# Patient Record
Sex: FEMALE | Race: BLACK OR AFRICAN AMERICAN | NOT HISPANIC OR LATINO | Employment: FULL TIME | URBAN - NONMETROPOLITAN AREA
[De-identification: names, ages, dates, MRNs, and addresses within clinical notes are randomized per-mention and may not be internally consistent; named-entity substitution may affect disease eponyms.]

---
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
## 2025-01-22 ENCOUNTER — OFFICE VISIT (OUTPATIENT)
Dept: PSYCHIATRY | Facility: OTHER | Age: 20
End: 2025-01-22
Attending: NURSE PRACTITIONER
Payer: COMMERCIAL

## 2025-01-22 VITALS
SYSTOLIC BLOOD PRESSURE: 122 MMHG | BODY MASS INDEX: 23.9 KG/M2 | WEIGHT: 143.6 LBS | TEMPERATURE: 97.6 F | HEART RATE: 87 BPM | OXYGEN SATURATION: 100 % | RESPIRATION RATE: 16 BRPM | DIASTOLIC BLOOD PRESSURE: 84 MMHG

## 2025-01-22 DIAGNOSIS — F15.21 METHAMPHETAMINE USE DISORDER, MODERATE, IN EARLY REMISSION (H): ICD-10-CM

## 2025-01-22 DIAGNOSIS — F33.1 MAJOR DEPRESSIVE DISORDER, RECURRENT EPISODE, MODERATE (H): ICD-10-CM

## 2025-01-22 DIAGNOSIS — F41.9 ANXIETY: Primary | ICD-10-CM

## 2025-01-22 PROCEDURE — G0463 HOSPITAL OUTPT CLINIC VISIT: HCPCS

## 2025-01-22 RX ORDER — VENLAFAXINE HYDROCHLORIDE 150 MG/1
150 CAPSULE, EXTENDED RELEASE ORAL DAILY
Qty: 30 CAPSULE | Refills: 3 | Status: SHIPPED | OUTPATIENT
Start: 2025-01-22

## 2025-01-22 ASSESSMENT — ANXIETY QUESTIONNAIRES
6. BECOMING EASILY ANNOYED OR IRRITABLE: MORE THAN HALF THE DAYS
GAD7 TOTAL SCORE: 11
1. FEELING NERVOUS, ANXIOUS, OR ON EDGE: MORE THAN HALF THE DAYS
IF YOU CHECKED OFF ANY PROBLEMS ON THIS QUESTIONNAIRE, HOW DIFFICULT HAVE THESE PROBLEMS MADE IT FOR YOU TO DO YOUR WORK, TAKE CARE OF THINGS AT HOME, OR GET ALONG WITH OTHER PEOPLE: SOMEWHAT DIFFICULT
7. FEELING AFRAID AS IF SOMETHING AWFUL MIGHT HAPPEN: SEVERAL DAYS
5. BEING SO RESTLESS THAT IT IS HARD TO SIT STILL: MORE THAN HALF THE DAYS
8. IF YOU CHECKED OFF ANY PROBLEMS, HOW DIFFICULT HAVE THESE MADE IT FOR YOU TO DO YOUR WORK, TAKE CARE OF THINGS AT HOME, OR GET ALONG WITH OTHER PEOPLE?: SOMEWHAT DIFFICULT
3. WORRYING TOO MUCH ABOUT DIFFERENT THINGS: SEVERAL DAYS
2. NOT BEING ABLE TO STOP OR CONTROL WORRYING: SEVERAL DAYS
4. TROUBLE RELAXING: MORE THAN HALF THE DAYS
GAD7 TOTAL SCORE: 11
GAD7 TOTAL SCORE: 11
7. FEELING AFRAID AS IF SOMETHING AWFUL MIGHT HAPPEN: SEVERAL DAYS

## 2025-01-22 ASSESSMENT — PATIENT HEALTH QUESTIONNAIRE - PHQ9
10. IF YOU CHECKED OFF ANY PROBLEMS, HOW DIFFICULT HAVE THESE PROBLEMS MADE IT FOR YOU TO DO YOUR WORK, TAKE CARE OF THINGS AT HOME, OR GET ALONG WITH OTHER PEOPLE: SOMEWHAT DIFFICULT
SUM OF ALL RESPONSES TO PHQ QUESTIONS 1-9: 13
SUM OF ALL RESPONSES TO PHQ QUESTIONS 1-9: 13

## 2025-01-22 ASSESSMENT — PAIN SCALES - GENERAL: PAINLEVEL_OUTOF10: SEVERE PAIN (8)

## 2025-01-22 NOTE — NURSING NOTE
"Chief Complaint   Patient presents with    Recheck Medication     Patient presents to the Clinic today for medication management. Patient states she's noticed she usually starts to feel a \"crash\" around 1600/1700 everyday.     Initial /84 (BP Location: Right arm, Patient Position: Sitting, Cuff Size: Adult Regular)   Pulse 87   Temp 97.6  F (36.4  C) (Temporal)   Resp 16   Wt 65.1 kg (143 lb 9.6 oz)   LMP 12/01/2024 (Exact Date)   SpO2 100%   BMI 23.90 kg/m   Estimated body mass index is 23.9 kg/m  as calculated from the following:    Height as of 12/17/24: 1.651 m (5' 5\").    Weight as of this encounter: 65.1 kg (143 lb 9.6 oz).  Medication Review: complete    The next two questions are to help us understand your food security.  If you are feeling you need any assistance in this area, we have resources available to support you today.          1/22/2025   SDOH- Food Insecurity   Within the past 12 months, did you worry that your food would run out before you got money to buy more? N   Within the past 12 months, did the food you bought just not last and you didn t have money to get more? N         Health Care Directive:  Patient does not have a Health Care Directive: Discussed advance care planning with patient; however, patient declined at this time.    Emi Bautista      "

## 2025-01-22 NOTE — PATIENT INSTRUCTIONS
"Rana, it was a pleasure seeing you today. As we discussed, we will increase Effexor Er to 150mg daily.        Call the clinic with medication questions or concerns at [phone] or send a [employer] message. [employer] may be used to communicate with your provider, but this is not intended to be used for emergencies.     Crisis Resources for [facility]: First Call for Help: (211), H.O.P.E. Crisis Line available 24/7: ([phone])  [place] Crisis Services: National Crisis Text Line: text HOME to 592377    Crisis Resources for [place]: Adult Mental Health crisis: 218-288-2100, [place] Crisis Text Line: text \"[place]\" or \"HOME\" to 037732    Therapy Options in [place], [place] and Surrounding Area:    Lucius Vazquez, [facility] - ([phone])    [facility] - [facility]ector Rosas ([phone])    Nicole Joshua, [facility], family and individual therapy- ([phone])    [facility] - specializes in women and adolescent therapy - ([phone])    [facility] - EMDR, trauma, etc. ([phone])    [facility] - spiritual based support groups ([phone])    [facility] - adults, adolescents and children ([phone])    [facility] - several different therapy options adults and children ([phone])    [facility] - several options, one of the largest mental health providers in the area - ([phone])    [facility] - ([phone])    [facility] - offers several therapy options including 8-week \"express\" therapy program - ([phone])    [facility] - ([phone])    [facility] - adult therapy ([phone])    [facility] - ([phone])    [facility] - ([phone])    [facility] - ([phone])    [facility] - ([phone])    [place], [place] - ([phone])    [facility]" [facility], [place] [phone]    [facility], [place] [phone]    [facility], [place] [phone]

## 2025-01-22 NOTE — PROGRESS NOTES
[facility] AND [facility] PSYCHIATRY   PROGRESS NOTE       ASSESSMENT & PLAN     This is a 19 year old female with a PMH of depression, methamphetamine abuse and anxiety who presents virtually for ongoing psychiatric care and medication management.      Diagnosis Comments   1. Major depressive disorder, recurrent episode, moderate to severe (H)      12/17/2024    11:33 AM 12/19/2024     8:18 AM 1/22/2025     8:41 AM   PHQ   PHQ-9 Total Score 19  18  13    Q9: Thoughts of better off dead/self-harm past 2 weeks Several days  Several days Not at all   F/U: Thoughts of suicide or self-harm No  No    F/U: Safety concerns No  No        Patient-reported    Proxy-reported             2. Methamphetamine use disorder, moderate, in early remission (H)  Early remission - stated she remains sober      3. Anxiety      12/5/2024     8:23 AM 12/19/2024     8:18 AM 1/22/2025     8:42 AM   TEAGAN-7 SCORE   Total Score 17 (severe anxiety) 12 (moderate anxiety) 11 (moderate anxiety)   Total Score 17  12  11        Patient-reported                         Depression: Improving symptoms of anhedonia, depressed mood, sleep disruption with delayed onset and frequent waking, appetite and overeating, feelings of low self-worth, trouble concentrating, restlessness, and suicidal ideation. Denied presence of SI, though again marked this on the screener.     Anxiety: Reports improvement in anxious mood, worrying about things and having trouble controlling the worry, trouble relaxing, restlessness, feeling easily irritable, fearing something awful happening.    Still some mild to moderate symptoms of depression and anxiety but notably improved. Has had some issues with insurance coverage of two different doses, so the last couple days took 3 of the 37.5mg. Took her last 3 today. Agreeable to increasing dose to 150mg so that she can just take one dose. This may also benefit residual symptoms. No s/e noted outside of some increased end of the  day fatigue, but this has benefited her sleep. No other issues related to mental health. Does have an issue with a tooth and is having trouble finding a dentist who will take her secondary to insurance. Recommended she call the [facility] to see if they will see her.      Medication:   Increase Effexor to 150mg daily  Psychotherapy: none  Labs: No labs today  F/U: 3 months    The risks, benefits, alternatives and side effects have been discussed and are understood by the patient. The patient understands the risks of using street drugs or alcohol. The patient understands to call 911, [employer] ([facility] Crisis Line) or come to the nearest ED if life threatening or urgent symptoms present.    The longitudinal plan of care for the diagnosis(es)/condition(s) as documented were addressed during this visit. Due to the added complexity in care, I will continue to support Danny in the subsequent management and with ongoing continuity of care.      HISTORY OF PRESENT ILLNESS     Danny Rosenberg was last seen in clinic on 12/19//24.  At that time:  Depression: Improving symptoms of anhedonia, depressed mood, sleep disruption with delayed onset and frequent waking, appetite and overeating, feelings of low self-worth, trouble concentrating, restlessness, and suicidal ideation. Denied presence of SI, though again marked this on the screener. Stated she feels things are going really well. Noted improved energy, having a lot on her mind but not getting fixated or obsessive about one thing, improved motivation and is not isolating as much, and does not feel as depressed as she did.      Anxiety: Reports improvement in anxious mood, worrying about things and having trouble controlling the worry, trouble relaxing, restlessness, feeling easily irritable, fearing something awful happening. Indicated that previously she would hyperfixate on one issue and obsessively ruminate about it. She still has anxiety but is able to  "consider multiple things going on versus becoming stuck on one thing.     Feels she could tolerate an increase to maximize on benefits. Screener scores are still fairly high;however, she only began to notice the most improvement in the past week when she went from 37.5mg to 75mg of Effexor. Agreeable to increasing to 112.5mg for a month to see how things go. No noted side effects. Was relieved not to experience any \"brain zaps,\" that she has heard about.      Initial onset: 7th grade noted anxiety and depression  Duration: ongoing with notable improvement  Modifying factors: medications helpful, has also improved diet, works more, exercises, hangs out with friends and attends [employer] and groups for sobriety  Adherence to treatment: good  Response to treatment: improvement noted  Contributing and complicating factors: methamphetamine abuse with new sobriety, recent pregnancy loss, daily marijuana use             [facility] Update - no changes   Psychiatric:   Previous trials of sertraline and prozac.  Started therapy at [facility] but can only be seen every month and a half, which is not workign for her. Voluntary 8 day admission to [facility] recently. Childhood trauma history - details not shared. H/O cutting, smashing and punching things, including smashing head repeatedly. Last episode over a month ago.   H/O suicide attempts, at least 8, by overdose and attempting to drive car off of road.     Social:  No changes. Single. No children. No HS grad or GED. Employed at [employer]. No legal issues. No  history. Has been attending [employer] and is hoping to get baptized.     Chemical Use:  Methamphetamine use since January 2023 with two periods of sobriety, both pregnancy related (lost pregnancies). Longest period was 4 months. Now sober about 3 months. Marijuana use daily for anxiety, sleep and stress.     Family:  H/o psychosis, depression, bipolar, and anxiety both sides.          REVIEW " OF SYSTEMS   The review of systems is negative other than noted in the HPI.    Past Medical History:   Diagnosis Date    Closed fracture of lower end of humerus     8/20/11,Left supracondylar humeral fracture; had pinning    Constipation     Miralax    Gastro-esophageal reflux disease without esophagitis     Age 5; had rash with ranitidine; did not try other medication at the time    Glycosuria     Blood test neg for glucose per mother    Personal history of other diseases of the female genital tract     term, no complications    Simple febrile convulsions (H)     4/13/2017      Current Outpatient Medications   Medication Instructions    buPROPion (WELLBUTRIN XL) 300 mg, Oral, EVERY MORNING    hydrocortisone (CORTAID) 1 % external cream Topical, 2 TIMES DAILY    traZODone (DESYREL) 50 mg, Oral, AT BEDTIME    XULANE 150-35 MCG/24HR patch No dose, route, or frequency recorded.         MENTAL STATUS EXAM   Vitals: /84 (BP Location: Right arm, Patient Position: Sitting, Cuff Size: Adult Regular)   Pulse 87   Temp 97.6  F (36.4  C) (Temporal)   Resp 16   Wt 65.1 kg (143 lb 9.6 oz)   LMP 12/01/2024 (Exact Date)   SpO2 100%   BMI 23.90 kg/m        Appearance:  awake, alert and adequately groomed  Attitude:  cooperative  Eye Contact:  good  Mood: Improving  Affect:  appropriate, congruent  Speech:  clear, coherent  Psychomotor Behavior:  no evidence of tardive dyskinesia, dystonia, or tics  Thought Process:  logical, linear, and goal oriented  Associations:  no loose associations  Thought Content:  No SI present, No HI, no evidence of psychosis or perceptual disturbances  Insight:  good  Judgment:  intact  Oriented to:  time, person, and place  Attention Span and Concentration:  intact  Recent and Remote Memory:  intact  Language: Able to name objects and Able to repeat phrases  Fund of Knowledge: appropriate    Suicide Risk Assessment:  Today Danny Alexander Shakira denied SI. In addition, she has notable  risk factors for self-harm, including previous attempts, single status, anxiety, and substance abuse. However, risk is mitigated by commitment to family, Evangelical beliefs, and sobriety. Therefore, based on all available evidence including the factors cited above, she does not appear to be at imminent risk for self-harm, does not meet criteria for a 72-hr hold, and therefore remains appropriate for ongoing outpatient level of care. Voluntary referral for individualized therapy was offered, she accepted this offer.        ER Labs reviewed  No labs to review     Allergies   Allergen Reactions    Ranitidine Hives         ATTESTATION    Areas addressed: Depression, moderate, improved but unstable; methamphetamine abuse, chronic and in early remission; anxiety, chronic, improved but unstable  Medication management  No independent Historian  No outside data ordered or reviewed on this day  No social determinates of health impacting provider's ability to diagnose or treat.  Moderate risk of complications, morbidity, and/or mortality of patient management decision made at visit, associated with patient's problem, diagnoses, procedures and treatments.    Figueroa Durand, APRN,CNP, PFMHNP    As the provider I attest to compliance with applicable laws and regulations related to telemedicine.     18 minutes spent on the date of the encounter doing chart review, history and exam, documentation and further activities per the note.

## 2025-02-02 ENCOUNTER — NURSE TRIAGE (OUTPATIENT)
Dept: NURSING | Facility: CLINIC | Age: 20
End: 2025-02-02
Payer: COMMERCIAL

## 2025-02-03 NOTE — TELEPHONE ENCOUNTER
Caller was irritable and unwilling to provide essential information necessary to complete a triage and was not respectful to the writer with requests to confirm identifying information and provide health care advice. Call ended with caller hanging up and no triage was initiated.     Reason for Disposition   Health Information question, no triage required and triager able to answer question    Protocols used: Information Only Call - No Triage-A-

## 2025-02-04 NOTE — TELEPHONE ENCOUNTER
REVIEW OF CARDIOVASCULAR SYSTEMS:  Cardiovascular problem(s): Shortness of Breath on Exertion    Patient does not smoke.    Patient does not take aspirin.     buPROPion (WELLBUTRIN XL) 150 MG 24 hr tablet (Discontinued) 30 tablet 1 7/24/2024 8/15/2024 No   Sig - Route: Take 1 tablet (150 mg) by mouth every morning - Oral     Last Prescription Date:     buPROPion (WELLBUTRIN XL) 300 MG 24 hr tablet 30 tablet 1 8/15/2024 -- No   Sig - Route: Take 1 tablet (300 mg) by mouth every morning - Oral     Pharmacy informed of new prescription sent 8/15/24 for higher dose. Jessica Bass RN .............. 8/16/2024  9:07 AM

## 2025-02-19 ENCOUNTER — OFFICE VISIT (OUTPATIENT)
Dept: FAMILY MEDICINE | Facility: OTHER | Age: 20
End: 2025-02-19
Attending: NURSE PRACTITIONER
Payer: COMMERCIAL

## 2025-02-19 VITALS
RESPIRATION RATE: 18 BRPM | HEART RATE: 111 BPM | BODY MASS INDEX: 23.95 KG/M2 | SYSTOLIC BLOOD PRESSURE: 112 MMHG | HEIGHT: 66 IN | WEIGHT: 149 LBS | DIASTOLIC BLOOD PRESSURE: 70 MMHG | TEMPERATURE: 98.3 F | OXYGEN SATURATION: 100 %

## 2025-02-19 DIAGNOSIS — J02.9 VIRAL PHARYNGITIS: Primary | ICD-10-CM

## 2025-02-19 DIAGNOSIS — J02.9 SORE THROAT: ICD-10-CM

## 2025-02-19 DIAGNOSIS — R05.1 ACUTE COUGH: ICD-10-CM

## 2025-02-19 LAB
FLUAV RNA SPEC QL NAA+PROBE: NEGATIVE
FLUBV RNA RESP QL NAA+PROBE: NEGATIVE
RSV RNA SPEC NAA+PROBE: NEGATIVE
S PYO DNA THROAT QL NAA+PROBE: NOT DETECTED
SARS-COV-2 RNA RESP QL NAA+PROBE: NEGATIVE

## 2025-02-19 PROCEDURE — G0463 HOSPITAL OUTPT CLINIC VISIT: HCPCS

## 2025-02-19 PROCEDURE — 87637 SARSCOV2&INF A&B&RSV AMP PRB: CPT | Mod: ZL

## 2025-02-19 PROCEDURE — 87651 STREP A DNA AMP PROBE: CPT | Mod: ZL

## 2025-02-19 RX ORDER — DEXAMETHASONE SODIUM PHOSPHATE 4 MG/ML
10 VIAL (ML) INJECTION ONCE
Status: DISCONTINUED | OUTPATIENT
Start: 2025-02-19 | End: 2025-02-19

## 2025-02-19 ASSESSMENT — ENCOUNTER SYMPTOMS
CARDIOVASCULAR NEGATIVE: 1
CHILLS: 0
NAUSEA: 0
FEVER: 0
SHORTNESS OF BREATH: 0
COUGH: 1
SORE THROAT: 1
SINUS PAIN: 0
DIARRHEA: 0
MUSCULOSKELETAL NEGATIVE: 1
VOMITING: 0
SINUS PRESSURE: 0

## 2025-02-19 ASSESSMENT — PAIN SCALES - GENERAL: PAINLEVEL_OUTOF10: NO PAIN (0)

## 2025-02-19 NOTE — LETTER
February 19, 2025      Danny Albaols  [address] [id number]        To Whom It May Concern:    Danny Rosenberg  was seen on 02/19/24.  Please excuse her from work due to illness.        Sincerely,        Yunier Gomes, PRESTON, APRN, FNP-C

## 2025-02-19 NOTE — PROGRESS NOTES
Danny Rosenberg  2005    ASSESSMENT/PLAN    1. Viral pharyngitis (Primary)  2. Acute cough  - Influenza A/B, RSV and SARS-CoV2 PCR (COVID-19) Nose  3. Sore throat  - Group A Streptococcus PCR Throat Swab    -Influenza, COVID, RSV testing negative.  Group A strep negative.  - Discussed with patient that symptoms and exam are consistent with viral illness.    - No clinical indications for antibiotic treatment at this time.  - Symptomatic treatment - Encouraged fluids, salt water gargles, honey, humidifier, saline nasal spray, lozenges, tea, soup, smoothies, popsicles, topical vapor rub, rest, etc   - May use over-the-counter Tylenol or ibuprofen PRN  - Follow up as needed for new or worsening symptoms          *Explanation of diagnosis, treatment options and risk and benefits of medications reviewed with patient. Patient agrees with plan of care.  *All questions were answered.    *Red flags symptoms were discussed and patient was advised when they should return for reevaluation or for prompt emergency evaluation.   *Patient was given verbal and written instructions on plan of care. Instructions were printed or are available on [employer] on electronic AVS.   *We discussed potential side effects of any prescribed or recommended therapies, as well as expectations for response to treatments.  *Patient discharged in stable condition    Yunier Gomes, PRESTON, APRN, FNP-C  [facility]    SUBJECTIVE  CHIEF COMPLAINT/ REASON FOR VISIT  No chief complaint on file.     HISTORY OF PRESENT ILLNESS  Danny Rosenberg is a pleasant 20 year old female presents to rapid clinic today with sore throat.  Over the last few days patient has been dealing with a sore throat, productive cough, body aches, but no fever.  She reports no known exposures.  She denies any nausea, vomiting or diarrhea.  She has been using over-the-counter DayQuil and NyQuil as needed.  Patient concerned about possible strep  "or RSV.    History provided by patient      I have reviewed the nursing notes.  I have reviewed allergies, medication list, problem list, and past medical history.    REVIEW OF SYSTEMS  Review of Systems   Constitutional:  Negative for chills and fever.   HENT:  Positive for sore throat. Negative for congestion, ear discharge, ear pain, sinus pressure and sinus pain.    Respiratory:  Positive for cough. Negative for shortness of breath.    Cardiovascular: Negative.    Gastrointestinal:  Negative for diarrhea, nausea and vomiting.   Musculoskeletal: Negative.    Skin: Negative.         VITAL SIGNS  Vitals:    02/19/25 1608   BP: 112/70   Pulse: 111   Resp: 18   Temp: 98.3  F (36.8  C)   TempSrc: Tympanic   SpO2: 100%   Weight: 67.6 kg (149 lb)   Height: 1.664 m (5' 5.5\")      Body mass index is 24.42 kg/m .      OBJECTIVE  PHYSICAL EXAM  Physical Exam  Vitals and nursing note reviewed.   Constitutional:       General: She is not in acute distress.     Appearance: Normal appearance. She is normal weight. She is not ill-appearing, toxic-appearing or diaphoretic.   HENT:      Head: Normocephalic and atraumatic.      Right Ear: Tympanic membrane, ear canal and external ear normal. There is no impacted cerumen.      Left Ear: Tympanic membrane, ear canal and external ear normal. There is no impacted cerumen.      Nose: Nose normal. No congestion or rhinorrhea.      Mouth/Throat:      Mouth: Mucous membranes are moist.      Pharynx: Oropharynx is clear. Posterior oropharyngeal erythema present. No oropharyngeal exudate.   Eyes:      Extraocular Movements: Extraocular movements intact.      Conjunctiva/sclera: Conjunctivae normal.      Pupils: Pupils are equal, round, and reactive to light.   Cardiovascular:      Rate and Rhythm: Normal rate and regular rhythm.      Pulses: Normal pulses.      Heart sounds: Normal heart sounds.   Pulmonary:      Effort: Pulmonary effort is normal. No respiratory distress.      Breath " sounds: Normal breath sounds. No wheezing or rhonchi.   Musculoskeletal:      Cervical back: Normal range of motion. No rigidity or tenderness.   Lymphadenopathy:      Cervical: No cervical adenopathy.   Neurological:      Mental Status: She is alert.            DIAGNOSTICS  Results for orders placed or performed in visit on 02/19/25   Influenza A/B, RSV and SARS-CoV2 PCR (COVID-19) Nose     Status: Normal    Specimen: Nose; Swab   Result Value Ref Range    Influenza A PCR Negative Negative    Influenza B PCR Negative Negative    RSV PCR Negative Negative    SARS CoV2 PCR Negative Negative    Narrative    Testing was performed using the Xpert Xpress CoV2/Flu/RSV Assay on the [employer] Instrument. This test should be ordered for the detection of SARS-CoV2, influenza, and RSV viruses in individuals with signs and symptoms of respiratory tract infection. This test is for in vitro diagnostic use under the [place] FDA for laboratories certified under CLIA to perform high or moderate complexity testing. This test has been [place] FDA cleared. A negative result does not rule out the presence of PCR inhibitors in the specimen or target RNA in concentration below the limit of detection for the assay. If only one viral target is positive but coinfection with multiple targets is suspected, the sample should be re-tested with another FDA cleared, approved, or authorized test, if coninfection would change clinical management. This test was validated by the [facility]. These laboratories are certified under the Clinical Laboratory Improvement Amendments of 1988 (CLIA-88) as qualified to perfom high complexity laboratory testing.   Group A Streptococcus PCR Throat Swab     Status: Normal    Specimen: Throat; Swab   Result Value Ref Range    Group A strep by PCR Not Detected Not Detected    Narrative    The Xpert Xpress Strep A test, performed on the [employer] Systems, is a rapid, qualitative in vitro  diagnostic test for the detection of Streptococcus pyogenes (Group A ß-hemolytic Streptococcus, Strep A) in throat swab specimens from patients with signs and symptoms of pharyngitis. The Xpert Xpress Strep A test can be used as an aid in the diagnosis of Group A Streptococcal pharyngitis. The assay is not intended to monitor treatment for Group A Streptococcus infections. The Xpert Xpress Strep A test utilizes an automated real-time polymerase chain reaction (PCR) to detect Streptococcus pyogenes DNA.

## 2025-02-19 NOTE — NURSING NOTE
"No chief complaint on file.    Patient here for cough, sore throat, fatigue, and nasal congestion x3 days. Has treated with dayquil.     Initial /70   Pulse 111   Temp 98.3  F (36.8  C) (Tympanic)   Resp 18   Ht 1.664 m (5' 5.5\")   Wt 67.6 kg (149 lb)   SpO2 100%   BMI 24.42 kg/m   Estimated body mass index is 24.42 kg/m  as calculated from the following:    Height as of this encounter: 1.664 m (5' 5.5\").    Weight as of this encounter: 67.6 kg (149 lb).  Medication Review: complete    The next two questions are to help us understand your food security.  If you are feeling you need any assistance in this area, we have resources available to support you today.          2/19/2025   SDOH- Food Insecurity   Within the past 12 months, did you worry that your food would run out before you got money to buy more? N   Within the past 12 months, did the food you bought just not last and you didn t have money to get more? N         Health Care Directive:  Patient does not have a Health Care Directive: Discussed advance care planning with patient; however, patient declined at this time.    Becky Gaston LPN      "

## 2025-03-26 ENCOUNTER — OFFICE VISIT (OUTPATIENT)
Dept: FAMILY MEDICINE | Facility: OTHER | Age: 20
End: 2025-03-26
Attending: FAMILY MEDICINE
Payer: COMMERCIAL

## 2025-03-26 VITALS
RESPIRATION RATE: 16 BRPM | TEMPERATURE: 97.7 F | WEIGHT: 149.8 LBS | HEART RATE: 90 BPM | BODY MASS INDEX: 24.96 KG/M2 | HEIGHT: 65 IN | DIASTOLIC BLOOD PRESSURE: 70 MMHG | OXYGEN SATURATION: 100 % | SYSTOLIC BLOOD PRESSURE: 112 MMHG

## 2025-03-26 DIAGNOSIS — F33.1 MODERATE EPISODE OF RECURRENT MAJOR DEPRESSIVE DISORDER (H): ICD-10-CM

## 2025-03-26 DIAGNOSIS — F33.1 MAJOR DEPRESSIVE DISORDER, RECURRENT EPISODE, MODERATE (H): ICD-10-CM

## 2025-03-26 DIAGNOSIS — Z00.00 ADULT GENERAL MEDICAL EXAM: Primary | ICD-10-CM

## 2025-03-26 DIAGNOSIS — N91.2 AMENORRHEA: ICD-10-CM

## 2025-03-26 DIAGNOSIS — R53.83 OTHER FATIGUE: ICD-10-CM

## 2025-03-26 DIAGNOSIS — F15.21 METHAMPHETAMINE USE DISORDER, MODERATE, IN EARLY REMISSION (H): ICD-10-CM

## 2025-03-26 DIAGNOSIS — F41.9 ANXIETY: ICD-10-CM

## 2025-03-26 PROBLEM — R45.851 SUICIDAL THOUGHTS: Status: RESOLVED | Noted: 2022-08-10 | Resolved: 2025-03-26

## 2025-03-26 PROBLEM — F32.4 MAJOR DEPRESSIVE DISORDER WITH SINGLE EPISODE, IN PARTIAL REMISSION: Status: RESOLVED | Noted: 2022-08-10 | Resolved: 2025-03-26

## 2025-03-26 PROBLEM — N96 HISTORY OF RECURRENT MISCARRIAGES: Status: ACTIVE | Noted: 2024-04-30

## 2025-03-26 PROBLEM — N96 HISTORY OF RECURRENT MISCARRIAGES: Status: RESOLVED | Noted: 2024-04-30 | Resolved: 2025-03-26

## 2025-03-26 PROBLEM — F32.9 MDD (MAJOR DEPRESSIVE DISORDER): Status: RESOLVED | Noted: 2024-07-08 | Resolved: 2025-03-26

## 2025-03-26 LAB
ANION GAP SERPL CALCULATED.3IONS-SCNC: 13 MMOL/L (ref 7–15)
BASOPHILS # BLD AUTO: 0.1 10E3/UL (ref 0–0.2)
BASOPHILS NFR BLD AUTO: 1 %
BUN SERPL-MCNC: 5.5 MG/DL (ref 6–20)
CALCIUM SERPL-MCNC: 9.7 MG/DL (ref 8.8–10.4)
CHLORIDE SERPL-SCNC: 102 MMOL/L (ref 98–107)
CREAT SERPL-MCNC: 0.84 MG/DL (ref 0.51–0.95)
EGFRCR SERPLBLD CKD-EPI 2021: >90 ML/MIN/1.73M2
EOSINOPHIL # BLD AUTO: 0.3 10E3/UL (ref 0–0.7)
EOSINOPHIL NFR BLD AUTO: 2 %
ERYTHROCYTE [DISTWIDTH] IN BLOOD BY AUTOMATED COUNT: 14 % (ref 10–15)
GLUCOSE SERPL-MCNC: 82 MG/DL (ref 70–99)
HCG UR QL: NEGATIVE
HCO3 SERPL-SCNC: 26 MMOL/L (ref 22–29)
HCT VFR BLD AUTO: 38.7 % (ref 35–47)
HGB BLD-MCNC: 12.6 G/DL (ref 11.7–15.7)
IMM GRANULOCYTES # BLD: 0.1 10E3/UL
IMM GRANULOCYTES NFR BLD: 1 %
LYMPHOCYTES # BLD AUTO: 3.4 10E3/UL (ref 0.8–5.3)
LYMPHOCYTES NFR BLD AUTO: 32 %
MCH RBC QN AUTO: 28 PG (ref 26.5–33)
MCHC RBC AUTO-ENTMCNC: 32.6 G/DL (ref 31.5–36.5)
MCV RBC AUTO: 86 FL (ref 78–100)
MONOCYTES # BLD AUTO: 0.7 10E3/UL (ref 0–1.3)
MONOCYTES NFR BLD AUTO: 6 %
NEUTROPHILS # BLD AUTO: 6.4 10E3/UL (ref 1.6–8.3)
NEUTROPHILS NFR BLD AUTO: 59 %
NRBC # BLD AUTO: 0 10E3/UL
NRBC BLD AUTO-RTO: 0 /100
PLATELET # BLD AUTO: 391 10E3/UL (ref 150–450)
POTASSIUM SERPL-SCNC: 3.8 MMOL/L (ref 3.4–5.3)
RBC # BLD AUTO: 4.5 10E6/UL (ref 3.8–5.2)
SODIUM SERPL-SCNC: 141 MMOL/L (ref 135–145)
TSH SERPL DL<=0.005 MIU/L-ACNC: 0.75 UIU/ML (ref 0.3–4.2)
WBC # BLD AUTO: 10.8 10E3/UL (ref 4–11)

## 2025-03-26 PROCEDURE — 82374 ASSAY BLOOD CARBON DIOXIDE: CPT | Mod: ZL | Performed by: FAMILY MEDICINE

## 2025-03-26 PROCEDURE — G0463 HOSPITAL OUTPT CLINIC VISIT: HCPCS

## 2025-03-26 PROCEDURE — 81025 URINE PREGNANCY TEST: CPT | Mod: ZL | Performed by: FAMILY MEDICINE

## 2025-03-26 PROCEDURE — 80048 BASIC METABOLIC PNL TOTAL CA: CPT | Mod: ZL | Performed by: FAMILY MEDICINE

## 2025-03-26 PROCEDURE — 36415 COLL VENOUS BLD VENIPUNCTURE: CPT | Mod: ZL | Performed by: FAMILY MEDICINE

## 2025-03-26 PROCEDURE — 84443 ASSAY THYROID STIM HORMONE: CPT | Mod: ZL | Performed by: FAMILY MEDICINE

## 2025-03-26 PROCEDURE — 85004 AUTOMATED DIFF WBC COUNT: CPT | Mod: ZL | Performed by: FAMILY MEDICINE

## 2025-03-26 SDOH — HEALTH STABILITY: PHYSICAL HEALTH: ON AVERAGE, HOW MANY DAYS PER WEEK DO YOU ENGAGE IN MODERATE TO STRENUOUS EXERCISE (LIKE A BRISK WALK)?: 3 DAYS

## 2025-03-26 ASSESSMENT — PATIENT HEALTH QUESTIONNAIRE - PHQ9
10. IF YOU CHECKED OFF ANY PROBLEMS, HOW DIFFICULT HAVE THESE PROBLEMS MADE IT FOR YOU TO DO YOUR WORK, TAKE CARE OF THINGS AT HOME, OR GET ALONG WITH OTHER PEOPLE: VERY DIFFICULT
SUM OF ALL RESPONSES TO PHQ QUESTIONS 1-9: 13
SUM OF ALL RESPONSES TO PHQ QUESTIONS 1-9: 13

## 2025-03-26 ASSESSMENT — SOCIAL DETERMINANTS OF HEALTH (SDOH): HOW OFTEN DO YOU GET TOGETHER WITH FRIENDS OR RELATIVES?: MORE THAN THREE TIMES A WEEK

## 2025-03-26 ASSESSMENT — PAIN SCALES - GENERAL: PAINLEVEL_OUTOF10: NO PAIN (0)

## 2025-03-26 NOTE — NURSING NOTE
"Chief Complaint   Patient presents with    Physical     Patient presents to the clinic for her annual physical. She states she has some question about her meds that she wanted to talk to her med manager about but was told she needed a primary provider first. She states her period is a couple days late but hey do tend to be irregular, but would like a pregnancy test to be sure.       Initial /70 (BP Location: Right arm, Patient Position: Sitting, Cuff Size: Adult Regular)   Pulse 90   Temp 97.7  F (36.5  C) (Tympanic)   Resp 16   Ht 1.657 m (5' 5.25\")   Wt 67.9 kg (149 lb 12.8 oz)   LMP 02/24/2025 (Approximate)   SpO2 100%   BMI 24.74 kg/m   Estimated body mass index is 24.74 kg/m  as calculated from the following:    Height as of this encounter: 1.657 m (5' 5.25\").    Weight as of this encounter: 67.9 kg (149 lb 12.8 oz).  Medication Review: complete    The next two questions are to help us understand your food security.  If you are feeling you need any assistance in this area, we have resources available to support you today.          3/26/2025   SDOH- Food Insecurity   Within the past 12 months, did you worry that your food would run out before you got money to buy more? N   Within the past 12 months, did the food you bought just not last and you didn t have money to get more? N         Health Care Directive:  Patient does not have a Health Care Directive: Discussed advance care planning with patient; however, patient declined at this time.    Yin Kaiser LPN      "

## 2025-03-26 NOTE — PROGRESS NOTES
Preventive Care Visit  [facility] AND [facility]  Richard Tran MD, Family Medicine  Mar 26, 2025      Assessment & Plan     Adult general medical exam  Preventative care discussed.  Immunizations up-to-date.  Discussed general measures for the insomnia.  Discussed importance of full sobriety from methamphetamine and heavier drugs.  Still to cut back on marijuana and vaping over time.  She declines STD testing at this point.  Follow-up in 1 year for any preventative physical examination, sooner if any problems.    Other fatigue  Go ahead with metabolic workup.  Discussed that the significant fatigue in the afternoon likely is multifactorial secondary to insomnia and then complicated with vaping and marijuana use along with the previous drug use.  If she has further concerns for attention deficit issues, then would need to continue to discuss that with mental health provider.  - CBC with Platelets & Differential; Future  - Basic metabolic panel; Future  - TSH Reflex GH; Future  - CBC with Platelets & Differential  - Basic metabolic panel  - TSH Reflex GH    Moderate episode of recurrent major depressive disorder (H)  Following with Figueroa Durand here with next appointment next month.    Anxiety  Following with Figueroa Durand as above.    Amenorrhea  Secondary to recent Xulane patches for the last month.  Confirm negative pregnancy test and then restart patch on Sunday.  - Pregnancy, Urine (HCG); Future  - Pregnancy, Urine (HCG)      Methamphetamine use disorder, moderate, in early remission (H)  Continue full sobriety.            Nicotine/Tobacco Cessation  She reports that she has been smoking vaping device. She has never used smokeless tobacco.  Nicotine/Tobacco Cessation Plan  Information offered: Patient not interested at this time      Counseling  Appropriate preventive services were addressed with this patient via screening, questionnaire, or discussion as appropriate for fall prevention, nutrition,  physical activity, Tobacco-use cessation, social engagement, weight loss and cognition.  Checklist reviewing preventive services available has been given to the patient.  Reviewed patient's diet, addressing concerns and/or questions.   She is at risk for lack of exercise and has been provided with information to increase physical activity for the benefit of her well-being.   The patient was instructed to see the dentist every 6 months.   She is at risk for psychosocial distress and has been provided with information to reduce risk.   The patient reports drinking more than 3 alcoholic drinks per day and/or more than 7 drhnks per week. The patient was counseled and given information about possible harmful effects of excessive alcohol intake.The patient's PHQ-9 score is consistent with moderate depression. She was provided with information regarding depression.           No follow-ups on file.    Dipesh Delgado is a 20 year old, presenting for the following:  Physical        3/26/2025     3:34 PM   Additional Questions   Roomed by Yin NICOLAS          HPI  20-year-old female here for annual preventative physical examination.  She needed to establish care for medical follow-ups.  She follows with Figueroa Durand in the mental health group for depression, anxiety and history of drug abuse.  She has been clean from methamphetamine for about a year.  She previously had also used cocaine and ecstasy.  She still uses marijuana regularly, but trying to cut that back.  She still vapes on a daily basis as well.  She is on Effexor.  That seems to be going overall pretty well from a depression and anxiety standpoint, but she was concerned about weight gain possibly.  She also does not sleep very well with significant insomnia.  She was planning to discuss that in further detail at appointment next month with Figueroa Durand.  She also follows with Ankit, her therapist at [facility].    She does feel like she has  "significant fatigue and feels like she \"crashes\" typically 2 to 3 PM on a daily basis.  She does not sleep well and is up multiple times at night.  She wondered if she had some attention issues also that are affected with the fatigue.  She does have family history of mother and father both with thyroid disease so she felt like she was high risk for that.      She also missed taking the Xulane patch for about a month.  Periods have been regular and appropriate with a lighter.  With the last week of placebo.  She then did not use it for the last month and needs to get going back on that.  She requests pregnancy test just to be sure.  She overall is otherwise doing well.  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  No other complaints.         Advance Care Planning  Patient does not have a Health Care Directive:       3/26/2025   General Health   How would you rate your overall physical health? (!) FAIR   Feel stress (tense, anxious, or unable to sleep) Very much   (!) STRESS CONCERN      3/26/2025   Nutrition   Three or more servings of calcium each day? (!) NO   Diet: Regular (no restrictions)   How many servings of fruit and vegetables per day? (!) 0-1   How many sweetened beverages each day? 0-1         3/26/2025   Exercise   Days per week of moderate/strenous exercise 3 days         3/26/2025   Social Factors   Frequency of gathering with friends or relatives More than three times a week   Worry food won't last until get money to buy more No   Food not last or not have enough money for food? No   Do you have housing? (Housing is defined as stable permanent housing and does not include staying ouside in a car, in a tent, in an abandoned building, in an overnight shelter, or couch-surfing.) No   Are you worried about losing your housing? Yes   Lack of transportation? No   Unable to get utilities (heat,electricity)? No   Want help with housing or utility concern? (!) YES   (!) HOUSING CONCERN PRESENT      3/26/2025 "   Dental   Dentist two times every year? (!) NO         Today's PHQ-9 Score:       3/26/2025     3:26 PM   PHQ-9 SCORE   PHQ-9 Total Score MyChart 13 (Moderate depression)   PHQ-9 Total Score 13        Patient-reported         3/26/2025   Substance Use   If I could quit smoking, I would Neutral   I want to quit somking, worry about health affects Neutral   Willing to make a plan to quit smoking Neutral   Willing to cut down before quitting Neutral   Alcohol more than 3/day or more than 7/wk Yes   How often do you have a drink containing alcohol 2 to 3 times a week   How many alcohol drinks on typical day 7 to 9   How often do you have 5+ drinks at one occasion Monthly   Audit 2/3 Score 5   How often not able to stop drinking once started Less than monthly   How often failed to do what normally expected Monthly   How often needed first drink in am after a heavy drinking session Monthly   How often feeling of guilt or remorse after drinking Never   How often unable to remember what happened the night before Weekly   Have you or someone else been injured because of your drinking Yes, during the last year   Has anyone been concerned or suggested you cut down on drinking Yes, during the last year   TOTAL SCORE - AUDIT 24   Do you use any other substances recreationally? No    (!) CANNABIS PRODUCTS       Multiple values from one day are sorted in reverse-chronological order     Social History     Tobacco Use    Smoking status: Every Day     Types: Vaping Device    Smokeless tobacco: Never   Vaping Use    Vaping status: Every Day    Substances: Nicotine, Flavoring    Devices: Disposable   Substance Use Topics    Alcohol use: Not Currently     Comment: social    Drug use: Yes     Types: Marijuana           3/26/2025   STI Screening   New sexual partner(s) since last STI/HIV test? 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      Gastro-esophageal reflux disease without esophagitis     Age 5; had rash with ranitidine; did not try other medication at the time    Glycosuria     Blood test neg for glucose per mother    Major depressive disorder, recurrent episode, moderate (H) 08/26/2024    Methamphetamine use disorder, moderate, in early remission (H) 08/26/2024    Personal history of other diseases of the female genital tract     term, no complications    Simple febrile convulsions (H)     4/13/2017     Past Surgical History:   Procedure Laterality Date    ELBOW SURGERY      8/20/11,Pinning for left supracondylar humeral fracture            Objective    Exam  /70 (BP Location: Right arm, Patient Position: Sitting, Cuff Size: Adult Regular)   Pulse 90   Temp 97.7  F (36.5  C) (Tympanic)   Resp 16   Ht 1.657 m (5' 5.25\")   Wt 67.9 kg (149 lb 12.8 oz)   LMP 02/24/2025 (Approximate)   SpO2 100%   BMI 24.74 kg/m     Estimated body mass index is 24.74 kg/m  as calculated from the following:    Height as of this encounter: 1.657 m (5' 5.25\").    Weight as of this encounter: 67.9 kg (149 lb 12.8 oz).    Physical Exam  GENERAL: alert and no distress  EYES: Eyes grossly normal to inspection, PERRL and conjunctivae and sclerae normal  HENT: ear canals and TM's normal, nose and mouth without ulcers or lesions  NECK: no adenopathy, no asymmetry, masses, or scars  RESP: lungs clear to " auscultation - no rales, rhonchi or wheezes  CV: regular rate and rhythm, normal S1 S2, no S3 or S4, no murmur, click or rub, no peripheral edema  ABDOMEN: soft, nontender, no hepatosplenomegaly, no masses and bowel sounds normal  MS: no gross musculoskeletal defects noted, no edema  SKIN: no suspicious lesions or rashes  NEURO: Normal strength and tone, mentation intact and speech normal  PSYCH: mentation appears normal, affect normal/bright  : not indicated      Vision Screen       Hearing Screen           Signed Electronically by: Richard Tran MD    Answers submitted by the patient for this visit:  Patient Health Questionnaire (Submitted on 3/26/2025)  If you checked off any problems, how difficult have these problems made it for you to do your work, take care of things at home, or get along with other people?: Very difficult  PHQ9 TOTAL SCORE: 13

## 2025-04-03 DIAGNOSIS — F33.1 MAJOR DEPRESSIVE DISORDER, RECURRENT EPISODE, MODERATE (H): ICD-10-CM

## 2025-04-03 RX ORDER — VENLAFAXINE HYDROCHLORIDE 150 MG/1
150 CAPSULE, EXTENDED RELEASE ORAL DAILY
Qty: 90 CAPSULE | OUTPATIENT
Start: 2025-04-03

## 2025-04-03 NOTE — TELEPHONE ENCOUNTER
Please see telephone call dated 4/3. Request to be sent to [place] pharmacy at this time. Jessica Bass RN .............. 4/3/2025  4:17 PM

## 2025-04-30 ENCOUNTER — OFFICE VISIT (OUTPATIENT)
Dept: PSYCHIATRY | Facility: OTHER | Age: 20
End: 2025-04-30
Attending: NURSE PRACTITIONER
Payer: COMMERCIAL

## 2025-04-30 VITALS
SYSTOLIC BLOOD PRESSURE: 106 MMHG | TEMPERATURE: 98.5 F | DIASTOLIC BLOOD PRESSURE: 70 MMHG | OXYGEN SATURATION: 100 % | WEIGHT: 148.4 LBS | HEART RATE: 94 BPM | RESPIRATION RATE: 16 BRPM | BODY MASS INDEX: 24.51 KG/M2

## 2025-04-30 DIAGNOSIS — Z79.899 CONTROLLED SUBSTANCE AGREEMENT SIGNED: Primary | ICD-10-CM

## 2025-04-30 DIAGNOSIS — F90.2 ADHD (ATTENTION DEFICIT HYPERACTIVITY DISORDER), COMBINED TYPE: ICD-10-CM

## 2025-04-30 DIAGNOSIS — F33.1 MAJOR DEPRESSIVE DISORDER, RECURRENT EPISODE, MODERATE (H): ICD-10-CM

## 2025-04-30 LAB
AMPHETAMINES UR QL SCN: ABNORMAL
BARBITURATES UR QL SCN: ABNORMAL
BENZODIAZ UR QL SCN: ABNORMAL
BZE UR QL SCN: ABNORMAL
CANNABINOIDS UR QL SCN: ABNORMAL
CREAT UR-MCNC: 78 MG/DL
FENTANYL UR QL: ABNORMAL
OPIATES UR QL SCN: ABNORMAL
PCP QUAL URINE (ROCHE): ABNORMAL

## 2025-04-30 PROCEDURE — 80307 DRUG TEST PRSMV CHEM ANLYZR: CPT | Mod: ZL | Performed by: NURSE PRACTITIONER

## 2025-04-30 PROCEDURE — G0463 HOSPITAL OUTPT CLINIC VISIT: HCPCS

## 2025-04-30 RX ORDER — VENLAFAXINE HYDROCHLORIDE 37.5 MG/1
37.5 CAPSULE, EXTENDED RELEASE ORAL DAILY
Qty: 30 CAPSULE | Refills: 2 | Status: SHIPPED | OUTPATIENT
Start: 2025-04-30

## 2025-04-30 RX ORDER — LISDEXAMFETAMINE DIMESYLATE 20 MG/1
20 CAPSULE ORAL EVERY MORNING
Qty: 30 CAPSULE | Refills: 0 | Status: SHIPPED | OUTPATIENT
Start: 2025-04-30

## 2025-04-30 RX ORDER — VENLAFAXINE HYDROCHLORIDE 150 MG/1
150 CAPSULE, EXTENDED RELEASE ORAL DAILY
Qty: 30 CAPSULE | Refills: 2 | Status: SHIPPED | OUTPATIENT
Start: 2025-04-30

## 2025-04-30 ASSESSMENT — ANXIETY QUESTIONNAIRES
7. FEELING AFRAID AS IF SOMETHING AWFUL MIGHT HAPPEN: NEARLY EVERY DAY
2. NOT BEING ABLE TO STOP OR CONTROL WORRYING: NEARLY EVERY DAY
4. TROUBLE RELAXING: NEARLY EVERY DAY
GAD7 TOTAL SCORE: 21
5. BEING SO RESTLESS THAT IT IS HARD TO SIT STILL: NEARLY EVERY DAY
7. FEELING AFRAID AS IF SOMETHING AWFUL MIGHT HAPPEN: NEARLY EVERY DAY
8. IF YOU CHECKED OFF ANY PROBLEMS, HOW DIFFICULT HAVE THESE MADE IT FOR YOU TO DO YOUR WORK, TAKE CARE OF THINGS AT HOME, OR GET ALONG WITH OTHER PEOPLE?: EXTREMELY DIFFICULT
IF YOU CHECKED OFF ANY PROBLEMS ON THIS QUESTIONNAIRE, HOW DIFFICULT HAVE THESE PROBLEMS MADE IT FOR YOU TO DO YOUR WORK, TAKE CARE OF THINGS AT HOME, OR GET ALONG WITH OTHER PEOPLE: EXTREMELY DIFFICULT
GAD7 TOTAL SCORE: 21
3. WORRYING TOO MUCH ABOUT DIFFERENT THINGS: NEARLY EVERY DAY
GAD7 TOTAL SCORE: 21
1. FEELING NERVOUS, ANXIOUS, OR ON EDGE: NEARLY EVERY DAY
6. BECOMING EASILY ANNOYED OR IRRITABLE: NEARLY EVERY DAY

## 2025-04-30 ASSESSMENT — PAIN SCALES - GENERAL: PAINLEVEL_OUTOF10: NO PAIN (0)

## 2025-04-30 ASSESSMENT — PATIENT HEALTH QUESTIONNAIRE - PHQ9
SUM OF ALL RESPONSES TO PHQ QUESTIONS 1-9: 23
SUM OF ALL RESPONSES TO PHQ QUESTIONS 1-9: 23
10. IF YOU CHECKED OFF ANY PROBLEMS, HOW DIFFICULT HAVE THESE PROBLEMS MADE IT FOR YOU TO DO YOUR WORK, TAKE CARE OF THINGS AT HOME, OR GET ALONG WITH OTHER PEOPLE: SOMEWHAT DIFFICULT

## 2025-04-30 NOTE — LETTER
[facility]  04/30/25  Patient: Danny Rosenberg  YOB: 2005  Medical Record Number: [id number]                                                                                  Non-Opioid Controlled Substance Agreement    This is an agreement between you and your provider regarding safe and appropriate use of controlled substances prescribed by your care team. Controlled substances are?medicines that can cause physical and mental dependence (abuse).     There are strict laws about having and using these medicines. We here at [facility] are  committed to working with you in your efforts to get better. To support you in this work, we'll help you schedule regular office appointments for medicine refills. If we must cancel or change your appointment for any reason, we'll make sure you have enough medicine to last until your next appointment.     As a Provider, I will:   Listen carefully to your concerns while treating you with respect.   Recommend a treatment plan that I believe is in your best interest and may involve therapies other than medicine.    Talk with you often about the possible benefits and the risk of harm of any medicine that we prescribe for you.  Assess the safety of this medicine and check how well it works.    Provide a plan on how to taper (discontinue or go off) using this medicine if the decision is made to stop its use.      ::  As a Patient, I understand controlled substances:     Are prescribed by my care provider to help me function or work and manage my condition(s).?  Are strong medicines and can cause serious side effects.     Need to be taken exactly as prescribed.?Combining controlled substances with certain medicines or chemicals (such as illegal drugs, alcohol, sedatives, sleeping pills, and benzodiazepines) can be dangerous or even fatal.? If I stop taking my medicines suddenly, I may have severe withdrawal symptoms.     The risks,  benefits, and side effects of these medicine(s) were explained to me. I agree that:    I will take part in other treatments as advised by my care team. This may be psychiatry or counseling, physical therapy, behavioral therapy, group treatment or a referral to specialist.    I will keep all my appointments and understand this is part of the monitoring of controlled substances.?My care team may require an office visit for EVERY controlled substance refill. If I miss appointments or don t follow instructions, my care team may stop my medicine    I will take my medicines as prescribed. I will not change the dose or schedule unless my care team tells me to. There will be no refills if I run out early.      I may be asked to come to the clinic and complete a urine drug test or complete a pill count. If I don t give a urine sample or participate in a pill count, the care team may stop my medicine.    I will only receive controlled substance prescriptions from this clinic. If I am treated by another provider, I will tell them that I am taking controlled substances and that I have a treatment agreement with this provider. I will inform my [facility] care team within one business day if I am given a prescription for any controlled substance by another healthcare provider. My [facility] care team can contact other providers and pharmacists about my use of any medicines.    It is up to me to make sure that I don't run out of my medicines on weekends or holidays.?If my care team is willing to refill my prescription without a visit, I must request refills only during office hours. Refills may take up to 3 business days to process. I will use one pharmacy to fill all my controlled substance prescriptions. I will notify the clinic about any changes to my insurance or medicine availability.    I am responsible for my prescriptions. If the medicine/prescription is lost, stolen or destroyed, it will not be replaced.?I  also agree not to share controlled substance medicines with anyone.     I am aware I should not use any illegal or recreational drugs. I agree not to drink alcohol unless my care team says I can.     If I enroll in the [place] Medical Cannabis program, I will tell my care team before my next refill.    I will tell my care team right away if I become pregnant, have a new medical problem treated outside of my regular clinic, or have a change in my medicines.     I understand that this medicine can affect my thinking, judgment and reaction time.? Alcohol and drugs affect the brain and body, which can affect the safety of my driving. Being under the influence of alcohol or drugs can affect my decision-making, behaviors, personal safety and the safety of others. Driving while impaired (DWI) can occur if a person is driving, operating or in physical control of a car, motorcycle, boat, snowmobile, ATV, motorbike, off-road vehicle or any other motor vehicle ([place] Statute 169A.20). I understand the risk if I choose to drive or operate any vehicle or machinery.    I understand that if I do not follow any of the conditions above, my prescriptions or treatment may be stopped or changed.   I agree that my provider, clinic care team and pharmacy may work with any city, state or federal law enforcement agency that investigates the misuse, sale or other diversion of my controlled medicine. I will allow my provider to discuss my care with, or share a copy of, this agreement with any other treating provider, pharmacy or emergency room where I receive care.     I have read this agreement and have asked questions about anything I did not understand.    ________________________________________________________  Patient Signature - Danny Rosenberg     ___________________                   Date     ________________________________________________________  Provider Signature - LUCILA Mohamud CNP        ___________________                   Date     ________________________________________________________  Witness Signature (required if provider not present while patient signing)          ___________________                   Date

## 2025-04-30 NOTE — NURSING NOTE
"Chief Complaint   Patient presents with    Medication Therapy Management   Patient presents to the Clinic today for medication management. Patient states she has some questions about starting new meds.    Initial /70 (BP Location: Right arm, Patient Position: Sitting, Cuff Size: Adult Regular)   Pulse 94   Temp 98.5  F (36.9  C) (Tympanic)   Resp 16   Wt 67.3 kg (148 lb 6.4 oz)   LMP 03/27/2025 (Exact Date)   SpO2 100%   BMI 24.51 kg/m   Estimated body mass index is 24.51 kg/m  as calculated from the following:    Height as of 3/26/25: 1.657 m (5' 5.25\").    Weight as of this encounter: 67.3 kg (148 lb 6.4 oz).  Medication Review: complete    The next two questions are to help us understand your food security.  If you are feeling you need any assistance in this area, we have resources available to support you today.          4/30/2025   SDOH- Food Insecurity   Within the past 12 months, did you worry that your food would run out before you got money to buy more? N   Within the past 12 months, did the food you bought just not last and you didn t have money to get more? N         Health Care Directive:  Patient does not have a Health Care Directive: Discussed advance care planning with patient; however, patient declined at this time.    Emi Bautista      "

## 2025-04-30 NOTE — PROGRESS NOTES
[facility] AND [facility] PSYCHIATRY   PROGRESS NOTE       ASSESSMENT & PLAN     This is a 19 year old female with a PMH of depression, methamphetamine abuse and anxiety who presents virtually for ongoing psychiatric care and medication management.      Diagnosis Comments   1. Major depressive disorder, recurrent episode, moderate to severe (H)      1/22/2025     8:41 AM 3/26/2025     3:26 PM 4/30/2025     7:44 AM   PHQ   PHQ-9 Total Score 13  13  23    Q9: Thoughts of better off dead/self-harm past 2 weeks Not at all Not at all Several days   F/U: Thoughts of suicide or self-harm   Yes   F/U: Self harm-plan   No   F/U: Self-harm action   No   F/U: Safety concerns   No       Patient-reported             2. Methamphetamine use disorder, moderate, in early remission (H)  Early remission - stated she remains sober      3. Anxiety      12/19/2024     8:18 AM 1/22/2025     8:42 AM 4/30/2025     7:45 AM   TEAGAN-7 SCORE   Total Score 12 (moderate anxiety) 11 (moderate anxiety) 21 (severe anxiety)   Total Score 12  11  21        Patient-reported                         Depression: Increasing symptoms of anhedonia, depressed mood, sleep disruption with delayed onset and frequent waking, anergia, appetite and overeating, feelings of low self-worth, trouble concentrating, restlessness, and suicidal ideation. SI is passive with no plans or intent. Just thoughts of not wanting to be around.     Anxiety: Reports increased anxious mood, worrying about things and having trouble controlling the worry, trouble relaxing, restlessness, feeling easily irritable, fearing something awful happening.    Believes symptoms have increased secondary to recent loss of job and starting a new job. Is now working in [place] at [employer]. Is trying to find living arrangements in [place] to be closer to work. Indicated her new therapist also recommended she inquire about medication for ADHD symptoms. She also indicated that she is going to  look into having a full diagnostic and testing for this and understands if a medication cannot be started sooner. She did have a diagnosis in childhood and was treated with Vyvanse and Adderall until she stopped showing up for appointments. Methamphetamine abuse complicated symptoms. She is currently experiencing a lot of amotivation, feeling like her energy is depleted, difficulty with focus, feeling so overwhelmed that she ends up doing nothing (she lost her job because she just didn't go in), and reports that friends frequently point out how she bounces from conversation to conversation, talking about multiple things at a time. She did see primary care regarding energy issues and had some lab testing done. All results were normal.     Given history, current symptoms, and reported methamphetamine cravings intermittently but prolonged sobriety, we agreed to a trial of low dose vyvanse to see how she does. In addition, we will increase Effexor to target mood and anxiety. Urine drug screen ordered and will be reviewed prior to ordering Vyvanse. She admits to marijuana use. Controlled substance contract signed. Resources for more extensive ADHD eval provided.    Medication:   Increase Effexor to 187.5mg daily  Psychotherapy: Is seeing a new therapist  Labs: UDS - completed and reviewed  F/U: 1 month    The risks, benefits, alternatives and side effects have been discussed and are understood by the patient. The patient understands the risks of using street drugs or alcohol. The patient understands to call 911, 211 ([facility] Crisis Line) or come to the nearest ED if life threatening or urgent symptoms present.    The longitudinal plan of care for the diagnosis(es)/condition(s) as documented were addressed during this visit. Due to the added complexity in care, I will continue to support Danny in the subsequent management and with ongoing continuity of care.      HISTORY OF PRESENT ILLNESS     Danny Alexander  Shakira was last seen in clinic on 01/22/2025.  At that time:  Still some mild to moderate symptoms of depression and anxiety but notably improved. Has had some issues with insurance coverage of two different doses, so the last couple days took 3 of the 37.5mg. Took her last 3 today. Agreeable to increasing dose to 150mg so that she can just take one dose. This may also benefit residual symptoms. No s/e noted outside of some increased end of the day fatigue, but this has benefited her sleep. No other issues related to mental health. Does have an issue with a tooth and is having trouble finding a dentist who will take her secondary to insurance. Recommended she call the [facility] to see if they will see her.        Initial onset: 7th grade noted anxiety and depression  Duration: ongoing with notable improvement  Modifying factors: medications helpful, has also improved diet, works more, exercises, hangs out with friends and attends [employer] and groups for sobriety  Adherence to treatment: good  Response to treatment: decompensation  Contributing and complicating factors: methamphetamine abuse with new sobriety, recent pregnancy loss, daily marijuana use; possibly untreated ADHD             [facility] Update - no changes   Psychiatric:   Previous trials of sertraline and prozac.  Started therapy at [facility] but can only be seen every month and a half, which is not workign for her. Voluntary 8 day admission to [facility] recently. Childhood trauma history - details not shared. H/O cutting, smashing and punching things, including smashing head repeatedly. Last episode over a month ago.   H/O suicide attempts, at least 8, by overdose and attempting to drive car off of road.     Social:  No changes. Single. No children. No HS grad or GED. Employed at [employer]. No legal issues. No  history. Has been attending [employer] and is hoping to get baptized.     Chemical Use:  Methamphetamine use  since January 2023 with two periods of sobriety, both pregnancy related (lost pregnancies). Longest period was 4 months. Now sober about 3 months. Marijuana use daily for anxiety, sleep and stress.     Family:  H/o psychosis, depression, bipolar, and anxiety both sides.          REVIEW OF SYSTEMS   The review of systems is negative other than noted in the HPI.    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      Gastro-esophageal reflux disease without esophagitis     Age 5; had rash with ranitidine; did not try other medication at the time    Glycosuria     Blood test neg for glucose per mother    Major depressive disorder, recurrent episode, moderate (H) 08/26/2024    Methamphetamine use disorder, moderate, in early remission (H) 08/26/2024    Personal history of other diseases of the female genital tract     term, no complications    Simple febrile convulsions (H)     4/13/2017      Current Outpatient Medications   Medication Instructions    buPROPion (WELLBUTRIN XL) 300 mg, Oral, EVERY MORNING    hydrocortisone (CORTAID) 1 % external cream Topical, 2 TIMES DAILY    traZODone (DESYREL) 50 mg, Oral, AT BEDTIME    XULANE 150-35 MCG/24HR patch No dose, route, or frequency recorded.         MENTAL STATUS EXAM   Vitals: /70 (BP Location: Right arm, Patient Position: Sitting, Cuff Size: Adult Regular)   Pulse 94   Temp 98.5  F (36.9  C) (Tympanic)   Resp 16   Wt 67.3 kg (148 lb 6.4 oz)   LMP 03/27/2025 (Exact Date)   SpO2 100%   BMI 24.51 kg/m        Appearance:  awake, alert and adequately groomed  Attitude:  cooperative  Eye Contact:  good  Mood: depressed, anxious  Affect:  appropriate, congruent  Speech:  clear,  coherent  Psychomotor Behavior:  no evidence of tardive dyskinesia, dystonia, or tics  Thought Process:  logical, linear, and goal oriented  Associations:  no loose associations  Thought Content:  No SI present, No HI, no evidence of psychosis or perceptual disturbances  Insight:  good  Judgment:  intact  Oriented to:  time, person, and place  Attention Span and Concentration:  intact  Recent and Remote Memory:  intact  Language: Able to name objects and Able to repeat phrases  Fund of Knowledge: appropriate    Suicide Risk Assessment:  Today Danny Rosneberg denied SI. In addition, she has notable risk factors for self-harm, including previous attempts, single status, anxiety, and substance abuse. However, risk is mitigated by commitment to family, [employer] beliefs, and sobriety. Therefore, based on all available evidence including the factors cited above, she does not appear to be at imminent risk for self-harm, does not meet criteria for a 72-hr hold, and therefore remains appropriate for ongoing outpatient level of care. Voluntary referral for individualized therapy was offered, she accepted this offer.        Office Visit on 03/26/2025   Component Date Value Ref Range Status    hCG Urine Qualitative 03/26/2025 Negative  Negative Final    This test is for screening purposes.  Results should be interpreted along with the clinical picture.  Confirmation testing is available if warranted by ordering [id number], HCG Quantitative Pregnancy.    Sodium 03/26/2025 141  135 - 145 mmol/L Final    Potassium 03/26/2025 3.8  3.4 - 5.3 mmol/L Final    Chloride 03/26/2025 102  98 - 107 mmol/L Final    Carbon Dioxide (CO2) 03/26/2025 26  22 - 29 mmol/L Final    Anion Gap 03/26/2025 13  7 - 15 mmol/L Final    Urea Nitrogen 03/26/2025 5.5 (L)  6.0 - 20.0 mg/dL Final    Creatinine 03/26/2025 0.84  0.51 - 0.95 mg/dL Final    GFR Estimate 03/26/2025 >90  >60 mL/min/1.73m2 Final    eGFR calculated using 2021 CKD-EPI equation.     Calcium 03/26/2025 9.7  8.8 - 10.4 mg/dL Final    Glucose 03/26/2025 82  70 - 99 mg/dL Final    TSH 03/26/2025 0.75  0.30 - 4.20 uIU/mL Final    WBC Count 03/26/2025 10.8  4.0 - 11.0 10e3/uL Final    RBC Count 03/26/2025 4.50  3.80 - 5.20 10e6/uL Final    Hemoglobin 03/26/2025 12.6  11.7 - 15.7 g/dL Final    Hematocrit 03/26/2025 38.7  35.0 - 47.0 % Final    MCV 03/26/2025 86  78 - 100 fL Final    MCH 03/26/2025 28.0  26.5 - 33.0 pg Final    MCHC 03/26/2025 32.6  31.5 - 36.5 g/dL Final    RDW 03/26/2025 14.0  10.0 - 15.0 % Final    Platelet Count 03/26/2025 391  150 - 450 10e3/uL Final    % Neutrophils 03/26/2025 59  % Final    % Lymphocytes 03/26/2025 32  % Final    % Monocytes 03/26/2025 6  % Final    % Eosinophils 03/26/2025 2  % Final    % Basophils 03/26/2025 1  % Final    % Immature Granulocytes 03/26/2025 1  % Final    NRBCs per 100 WBC 03/26/2025 0  <1 /100 Final    Absolute Neutrophils 03/26/2025 6.4  1.6 - 8.3 10e3/uL Final    Absolute Lymphocytes 03/26/2025 3.4  0.8 - 5.3 10e3/uL Final    Absolute Monocytes 03/26/2025 0.7  0.0 - 1.3 10e3/uL Final    Absolute Eosinophils 03/26/2025 0.3  0.0 - 0.7 10e3/uL Final    Absolute Basophils 03/26/2025 0.1  0.0 - 0.2 10e3/uL Final    Absolute Immature Granulocytes 03/26/2025 0.1  <=0.4 10e3/uL Final    Absolute NRBCs 03/26/2025 0.0  10e3/uL Final       Allergies   Allergen Reactions    Ranitidine Hives       ATTESTATION    Areas addressed: Depression, moderate, unstable; methamphetamine abuse, chronic and in remission; anxiety, chronic, unstable  Medication management  No independent Historian  No outside data ordered or reviewed on this day  No social determinates of health impacting provider's ability to diagnose or treat.  Moderate risk of complications, morbidity, and/or mortality of patient management decision made at visit, associated with patient's problem, diagnoses, procedures and treatments.    LUCILA Harrison,CNP, PFPAULO    As the provider I attest  to compliance with applicable laws and regulations related to telemedicine.     40 minutes spent on the date of the encounter doing chart review, history and exam, documentation and further activities per the note.

## 2025-04-30 NOTE — PATIENT INSTRUCTIONS
Increase Effexor XR to 187.5mg (150mg cap and a 37.5mg cap) daily  Start Vyvanse 20mg daily - pending UDS  F/U 1 month

## 2025-05-04 LAB
CANNABINOIDS UR CFM-MCNC: 624 NG/ML
CARBOXYTHC/CREAT UR: 800 NG/MG CREAT

## 2025-05-29 ENCOUNTER — OFFICE VISIT (OUTPATIENT)
Dept: PSYCHIATRY | Facility: OTHER | Age: 20
End: 2025-05-29
Attending: NURSE PRACTITIONER
Payer: COMMERCIAL

## 2025-05-29 VITALS
DIASTOLIC BLOOD PRESSURE: 71 MMHG | RESPIRATION RATE: 16 BRPM | WEIGHT: 146.4 LBS | HEART RATE: 87 BPM | OXYGEN SATURATION: 100 % | TEMPERATURE: 97.5 F | SYSTOLIC BLOOD PRESSURE: 107 MMHG | BODY MASS INDEX: 24.18 KG/M2

## 2025-05-29 DIAGNOSIS — F33.1 MAJOR DEPRESSIVE DISORDER, RECURRENT EPISODE, MODERATE (H): ICD-10-CM

## 2025-05-29 DIAGNOSIS — F90.2 ADHD (ATTENTION DEFICIT HYPERACTIVITY DISORDER), COMBINED TYPE: ICD-10-CM

## 2025-05-29 DIAGNOSIS — F41.9 ANXIETY: Primary | ICD-10-CM

## 2025-05-29 DIAGNOSIS — F15.21 METHAMPHETAMINE USE DISORDER, MODERATE, IN EARLY REMISSION (H): ICD-10-CM

## 2025-05-29 PROCEDURE — G0463 HOSPITAL OUTPT CLINIC VISIT: HCPCS

## 2025-05-29 RX ORDER — LISDEXAMFETAMINE DIMESYLATE 20 MG/1
20 CAPSULE ORAL DAILY
Qty: 30 CAPSULE | Refills: 0 | Status: SHIPPED | OUTPATIENT
Start: 2025-05-29 | End: 2025-06-28

## 2025-05-29 RX ORDER — LISDEXAMFETAMINE DIMESYLATE 20 MG/1
20 CAPSULE ORAL DAILY
Qty: 30 CAPSULE | Refills: 0 | Status: SHIPPED | OUTPATIENT
Start: 2025-07-28 | End: 2025-08-27

## 2025-05-29 RX ORDER — GABAPENTIN 300 MG/1
300 CAPSULE ORAL 3 TIMES DAILY
Qty: 90 CAPSULE | Refills: 1 | Status: SHIPPED | OUTPATIENT
Start: 2025-05-29

## 2025-05-29 RX ORDER — LISDEXAMFETAMINE DIMESYLATE 20 MG/1
20 CAPSULE ORAL DAILY
Qty: 30 CAPSULE | Refills: 0 | Status: SHIPPED | OUTPATIENT
Start: 2025-06-28 | End: 2025-07-28

## 2025-05-29 ASSESSMENT — ANXIETY QUESTIONNAIRES
3. WORRYING TOO MUCH ABOUT DIFFERENT THINGS: MORE THAN HALF THE DAYS
GAD7 TOTAL SCORE: 16
IF YOU CHECKED OFF ANY PROBLEMS ON THIS QUESTIONNAIRE, HOW DIFFICULT HAVE THESE PROBLEMS MADE IT FOR YOU TO DO YOUR WORK, TAKE CARE OF THINGS AT HOME, OR GET ALONG WITH OTHER PEOPLE: SOMEWHAT DIFFICULT
5. BEING SO RESTLESS THAT IT IS HARD TO SIT STILL: NEARLY EVERY DAY
6. BECOMING EASILY ANNOYED OR IRRITABLE: NEARLY EVERY DAY
1. FEELING NERVOUS, ANXIOUS, OR ON EDGE: NEARLY EVERY DAY
8. IF YOU CHECKED OFF ANY PROBLEMS, HOW DIFFICULT HAVE THESE MADE IT FOR YOU TO DO YOUR WORK, TAKE CARE OF THINGS AT HOME, OR GET ALONG WITH OTHER PEOPLE?: SOMEWHAT DIFFICULT
4. TROUBLE RELAXING: MORE THAN HALF THE DAYS
7. FEELING AFRAID AS IF SOMETHING AWFUL MIGHT HAPPEN: SEVERAL DAYS
GAD7 TOTAL SCORE: 16
2. NOT BEING ABLE TO STOP OR CONTROL WORRYING: MORE THAN HALF THE DAYS
GAD7 TOTAL SCORE: 16
7. FEELING AFRAID AS IF SOMETHING AWFUL MIGHT HAPPEN: SEVERAL DAYS

## 2025-05-29 ASSESSMENT — PATIENT HEALTH QUESTIONNAIRE - PHQ9
SUM OF ALL RESPONSES TO PHQ QUESTIONS 1-9: 14
SUM OF ALL RESPONSES TO PHQ QUESTIONS 1-9: 14
10. IF YOU CHECKED OFF ANY PROBLEMS, HOW DIFFICULT HAVE THESE PROBLEMS MADE IT FOR YOU TO DO YOUR WORK, TAKE CARE OF THINGS AT HOME, OR GET ALONG WITH OTHER PEOPLE: VERY DIFFICULT

## 2025-05-29 ASSESSMENT — PAIN SCALES - GENERAL: PAINLEVEL_OUTOF10: SEVERE PAIN (9)

## 2025-05-29 NOTE — PROGRESS NOTES
[facility] AND [facility] PSYCHIATRY   PROGRESS NOTE       ASSESSMENT & PLAN     This is a 19 year old female with a PMH of depression, methamphetamine abuse and anxiety who presents virtually for ongoing psychiatric care and medication management.      Diagnosis Comments   1. Major depressive disorder, recurrent episode, moderate to severe (H)      3/26/2025     3:26 PM 4/30/2025     7:44 AM 5/29/2025    10:08 AM   PHQ   PHQ-9 Total Score 13  23  14    Q9: Thoughts of better off dead/self-harm past 2 weeks Not at all Several days Not at all   F/U: Thoughts of suicide or self-harm  Yes    F/U: Self harm-plan  No    F/U: Self-harm action  No    F/U: Safety concerns  No        Patient-reported             2. Methamphetamine use disorder, moderate, in early remission (H)  Early remission - stated she remains sober      3. Anxiety      1/22/2025     8:42 AM 4/30/2025     7:45 AM 5/29/2025    10:11 AM   TEAGAN-7 SCORE   Total Score 11 (moderate anxiety) 21 (severe anxiety) 16 (severe anxiety)   Total Score 11  21  16        Patient-reported                         Depression: Improvement in symptoms of anhedonia, depressed mood, anergia, feelings of low self-worth, trouble concentrating, restlessness, and resolution of passive suicidal ideation. Struggling with sleep still, noting ability to fall asleep but waking frequently, sometimes every half hour. Unclear what is waking her, reporting sometimes she needs to use the bathroom, other times she wakes up and wants to eat. This has not increased with stimulant use but has not improved. Still overeating, specifically at night. Feels tired during the day secondary to this. Has been using an over the counter sleep aid, z-quil, which helps a little.     Anxiety: Reports ongoing anxious mood, worrying about things and having trouble controlling the worry, trouble relaxing, restlessness, feeling easily irritable, fearing something awful happening. Some mild improvement but  "nothing significant. Stated her biggest issue is that she will worry about something and have trouble moving on, then become frustrated and \"want to just scream.\" A friend told her this seems to occur during periods of stress and fear.     ADHD: Notable improvement in motivation, focus, and task initiation. Had some headaches the first couple weeks, but increased her fluid intake, which resolved this. No other s/e. No wear off noted and feels dosing is good.     Agreeable to addition of Gabapentin, which can be used three times daily scheduled, or as needed if it makes her tired. May also help with sleep, in addition to anxiety. Also recommended trying Unisom tablets/Doxylamine for sleep instead of z-quil, which is diphenhydramine and more likely to cause hang-over effects in the morning.     Moving to [place] as of June 1, but would like to continue all her services at [facility].       Medication:   Continue Effexor 187.5mg daily  Continue Vyvanse 20mg daily  Start Gabapentin 300mg up to three times daily - may use as needed if preferred  Psychotherapy: Is seeing a new therapist  Labs: UDS - completed and reviewed  F/U: 1 month    The risks, benefits, alternatives and side effects have been discussed and are understood by the patient. The patient understands the risks of using street drugs or alcohol. The patient understands to call 911, 211 ([facility] Crisis Line) or come to the nearest ED if life threatening or urgent symptoms present.    The longitudinal plan of care for the diagnosis(es)/condition(s) as documented were addressed during this visit. Due to the added complexity in care, I will continue to support Danny in the subsequent management and with ongoing continuity of care.      HISTORY OF PRESENT ILLNESS     Danny Rosenberg was last seen in clinic on 04/30/2025.  At that time:  Believes symptoms have increased secondary to recent loss of job and starting a new job. Is now working in [place] at " [employer]. Is trying to find living arrangements in [place] to be closer to work. Indicated her new therapist also recommended she inquire about medication for ADHD symptoms. She also indicated that she is going to look into having a full diagnostic and testing for this and understands if a medication cannot be started sooner. She did have a diagnosis in childhood and was treated with Vyvanse and Adderall until she stopped showing up for appointments. Methamphetamine abuse complicated symptoms. She is currently experiencing a lot of amotivation, feeling like her energy is depleted, difficulty with focus, feeling so overwhelmed that she ends up doing nothing (she lost her job because she just didn't go in), and reports that friends frequently point out how she bounces from conversation to conversation, talking about multiple things at a time. She did see primary care regarding energy issues and had some lab testing done. All results were normal.     Given history, current symptoms, and reported methamphetamine cravings intermittently but prolonged sobriety, we agreed to a trial of low dose vyvanse to see how she does. In addition, we will increase Effexor to target mood and anxiety. Urine drug screen ordered and will be reviewed prior to ordering Vyvanse. She admits to marijuana use. Controlled substance contract signed. Resources for more extensive ADHD eval provided.      Initial onset: 7th grade noted anxiety and depression  Duration: ongoing with notable improvement  Modifying factors: medications helpful, has also improved diet, works more, exercises, hangs out with friends and attends Christianity and groups for sobriety  Adherence to treatment: good  Response to treatment: Some improvement  Contributing and complicating factors: methamphetamine abuse with new sobriety, recent pregnancy loss, daily marijuana use;            [facility] Update - no changes   Psychiatric:   Previous trials of sertraline and  prozac.  Started therapy at [facility] but can only be seen every month and a half, which is not workign for her. Voluntary 8 day admission to [facility] recently. Childhood trauma history - details not shared. H/O cutting, smashing and punching things, including smashing head repeatedly. Last episode over a month ago.   H/O suicide attempts, at least 8, by overdose and attempting to drive car off of road.     Social:  No changes. Single. No children. No HS grad or GED. Employed at [employer]. No legal issues. No  history. Has been attending [employer] and is hoping to get baptized.     Chemical Use:  Methamphetamine use since January 2023 with two periods of sobriety, both pregnancy related (lost pregnancies). Longest period was 4 months. Now sober about 3 months. Marijuana use daily for anxiety, sleep and stress.     Family:  H/o psychosis, depression, bipolar, and anxiety both sides.          REVIEW OF SYSTEMS   The review of systems is negative other than noted in the HPI.    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      Gastro-esophageal reflux disease without esophagitis     Age 5; had rash with ranitidine; did not try other medication at the time    Glycosuria     Blood test neg for glucose per mother    Major depressive disorder, recurrent episode, moderate (H) 08/26/2024    Methamphetamine use disorder, moderate, in early remission (H) 08/26/2024    Personal history of other diseases of the female genital tract     term, no complications    Simple febrile convulsions (H)     4/13/2017      Current Outpatient Medications   Medication Sig Dispense Refill    gabapentin (NEURONTIN) 300 MG  capsule Take 1 capsule (300 mg) by mouth 3 times daily. 90 capsule 1    lisdexamfetamine (VYVANSE) 20 MG capsule Take 1 capsule (20 mg) by mouth daily. 30 capsule 0    [START ON 6/28/2025] lisdexamfetamine (VYVANSE) 20 MG capsule Take 1 capsule (20 mg) by mouth daily. 30 capsule 0    [START ON 7/28/2025] lisdexamfetamine (VYVANSE) 20 MG capsule Take 1 capsule (20 mg) by mouth daily. 30 capsule 0    venlafaxine (EFFEXOR XR) 150 MG 24 hr capsule Take 1 capsule (150 mg) by mouth daily. 30 capsule 2    venlafaxine (EFFEXOR XR) 37.5 MG 24 hr capsule Take 1 capsule (37.5 mg) by mouth daily. 30 capsule 2    XULANE 150-35 MCG/24HR patch        No current facility-administered medications for this visit.           MENTAL STATUS EXAM   Vitals: /71 (BP Location: Right arm, Patient Position: Sitting, Cuff Size: Adult Regular)   Pulse 87   Temp 97.5  F (36.4  C) (Temporal)   Resp 16   Wt 66.4 kg (146 lb 6.4 oz)   LMP 05/24/2025 (Approximate)   SpO2 100%   BMI 24.18 kg/m        Appearance:  awake, alert and adequately groomed  Attitude:  cooperative  Eye Contact:  good  Mood: improved, some anxiety  Affect:  appropriate, congruent  Speech:  clear, coherent  Psychomotor Behavior:  no evidence of tardive dyskinesia, dystonia, or tics  Thought Process:  logical, linear, and goal oriented  Associations:  no loose associations  Thought Content:  No SI present, No HI, no evidence of psychosis or perceptual disturbances  Insight:  good  Judgment:  intact  Oriented to:  time, person, and place  Attention Span and Concentration:  intact  Recent and Remote Memory:  intact  Language: Able to name objects and Able to repeat phrases  Fund of Knowledge: appropriate    Suicide Risk Assessment:  Today Danny Alexander Shakira denied SI. In addition, she has notable risk factors for self-harm, including previous attempts, single status, anxiety, and substance abuse. However, risk is mitigated by commitment to family, [employer]ism  beliefs, and sobriety. Therefore, based on all available evidence including the factors cited above, she does not appear to be at imminent risk for self-harm, does not meet criteria for a 72-hr hold, and therefore remains appropriate for ongoing outpatient level of care. Voluntary referral for individualized therapy was offered, she accepted this offer.        No recent labs to review    Allergies   Allergen Reactions    Ranitidine Hives       ATTESTATION    Areas addressed: Depression, moderate, unstable but improving; methamphetamine abuse, chronic and in remission; anxiety, chronic, unstable; ADHD, chronic, stable  Medication management  No independent Historian  No outside data ordered or reviewed on this day  No social determinates of health impacting provider's ability to diagnose or treat.  Moderate risk of complications, morbidity, and/or mortality of patient management decision made at visit, associated with patient's problem, diagnoses, procedures and treatments.    Figueroa Durand, LUCILA,CNP, PFNP    As the provider I attest to compliance with applicable laws and regulations related to telemedicine.   28 minutes spent on the date of the encounter doing chart review, history and exam, documentation and further activities per the note.

## 2025-05-29 NOTE — NURSING NOTE
"Chief Complaint   Patient presents with    Medication Therapy Management   Patient presents to the Clinic today for medication management.     Initial /71 (BP Location: Right arm, Patient Position: Sitting, Cuff Size: Adult Regular)   Pulse 87   Temp 97.5  F (36.4  C) (Temporal)   Resp 16   Wt 66.4 kg (146 lb 6.4 oz)   LMP 05/24/2025 (Approximate)   SpO2 100%   BMI 24.18 kg/m   Estimated body mass index is 24.18 kg/m  as calculated from the following:    Height as of 3/26/25: 1.657 m (5' 5.25\").    Weight as of this encounter: 66.4 kg (146 lb 6.4 oz).  Medication Review: complete    The next two questions are to help us understand your food security.  If you are feeling you need any assistance in this area, we have resources available to support you today.          5/29/2025   SDOH- Food Insecurity   Within the past 12 months, did you worry that your food would run out before you got money to buy more? N   Within the past 12 months, did the food you bought just not last and you didn t have money to get more? N         Health Care Directive:  Patient does not have a Health Care Directive: Discussed advance care planning with patient; however, patient declined at this time.    Emi Bautista      "

## 2025-05-29 NOTE — PATIENT INSTRUCTIONS
Medication:   Continue Effexor 187.5mg daily  Continue Vyvanse 20mg daily  Start Gabapentin 300mg up to three times daily - may use as needed if preferred  Psychotherapy: Is seeing a new therapist  Labs: UDS - completed and reviewed  F/U: 1 month

## 2025-07-03 ENCOUNTER — OFFICE VISIT (OUTPATIENT)
Dept: PSYCHIATRY | Facility: OTHER | Age: 20
End: 2025-07-03
Attending: NURSE PRACTITIONER
Payer: COMMERCIAL

## 2025-07-03 ENCOUNTER — OFFICE VISIT (OUTPATIENT)
Dept: FAMILY MEDICINE | Facility: OTHER | Age: 20
End: 2025-07-03
Attending: NURSE PRACTITIONER
Payer: COMMERCIAL

## 2025-07-03 VITALS
SYSTOLIC BLOOD PRESSURE: 108 MMHG | TEMPERATURE: 97.4 F | BODY MASS INDEX: 22.95 KG/M2 | DIASTOLIC BLOOD PRESSURE: 75 MMHG | HEART RATE: 89 BPM | OXYGEN SATURATION: 100 % | WEIGHT: 139 LBS | RESPIRATION RATE: 16 BRPM

## 2025-07-03 VITALS
BODY MASS INDEX: 22.95 KG/M2 | TEMPERATURE: 98 F | DIASTOLIC BLOOD PRESSURE: 64 MMHG | OXYGEN SATURATION: 98 % | WEIGHT: 139 LBS | SYSTOLIC BLOOD PRESSURE: 98 MMHG | HEART RATE: 69 BPM | RESPIRATION RATE: 16 BRPM

## 2025-07-03 DIAGNOSIS — G47.00 INSOMNIA, UNSPECIFIED TYPE: Primary | ICD-10-CM

## 2025-07-03 DIAGNOSIS — Z30.09 BIRTH CONTROL COUNSELING: ICD-10-CM

## 2025-07-03 DIAGNOSIS — F90.2 ADHD (ATTENTION DEFICIT HYPERACTIVITY DISORDER), COMBINED TYPE: Primary | ICD-10-CM

## 2025-07-03 DIAGNOSIS — F33.1 MAJOR DEPRESSIVE DISORDER, RECURRENT EPISODE, MODERATE (H): ICD-10-CM

## 2025-07-03 DIAGNOSIS — F41.9 ANXIETY: ICD-10-CM

## 2025-07-03 DIAGNOSIS — R06.83 SNORING: ICD-10-CM

## 2025-07-03 DIAGNOSIS — K59.00 CONSTIPATION, UNSPECIFIED CONSTIPATION TYPE: ICD-10-CM

## 2025-07-03 PROCEDURE — G0463 HOSPITAL OUTPT CLINIC VISIT: HCPCS

## 2025-07-03 RX ORDER — LISDEXAMFETAMINE DIMESYLATE 40 MG/1
40 CAPSULE ORAL DAILY
Qty: 30 CAPSULE | Refills: 0 | Status: SHIPPED | OUTPATIENT
Start: 2025-08-02 | End: 2025-09-01

## 2025-07-03 RX ORDER — VENLAFAXINE HYDROCHLORIDE 37.5 MG/1
37.5 CAPSULE, EXTENDED RELEASE ORAL DAILY
Qty: 30 CAPSULE | Refills: 3 | Status: SHIPPED | OUTPATIENT
Start: 2025-07-03

## 2025-07-03 RX ORDER — VENLAFAXINE HYDROCHLORIDE 150 MG/1
150 CAPSULE, EXTENDED RELEASE ORAL DAILY
Qty: 30 CAPSULE | Refills: 3 | Status: SHIPPED | OUTPATIENT
Start: 2025-07-03

## 2025-07-03 RX ORDER — LISDEXAMFETAMINE DIMESYLATE 40 MG/1
40 CAPSULE ORAL DAILY
Qty: 30 CAPSULE | Refills: 0 | Status: SHIPPED | OUTPATIENT
Start: 2025-09-01 | End: 2025-10-01

## 2025-07-03 RX ORDER — GABAPENTIN 300 MG/1
600 CAPSULE ORAL 3 TIMES DAILY
Qty: 180 CAPSULE | Refills: 1 | Status: SHIPPED | OUTPATIENT
Start: 2025-07-03

## 2025-07-03 RX ORDER — LISDEXAMFETAMINE DIMESYLATE 40 MG/1
40 CAPSULE ORAL DAILY
Qty: 30 CAPSULE | Refills: 0 | Status: SHIPPED | OUTPATIENT
Start: 2025-07-03 | End: 2025-08-02

## 2025-07-03 ASSESSMENT — ANXIETY QUESTIONNAIRES
GAD7 TOTAL SCORE: 12
7. FEELING AFRAID AS IF SOMETHING AWFUL MIGHT HAPPEN: SEVERAL DAYS
8. IF YOU CHECKED OFF ANY PROBLEMS, HOW DIFFICULT HAVE THESE MADE IT FOR YOU TO DO YOUR WORK, TAKE CARE OF THINGS AT HOME, OR GET ALONG WITH OTHER PEOPLE?: SOMEWHAT DIFFICULT
4. TROUBLE RELAXING: MORE THAN HALF THE DAYS
7. FEELING AFRAID AS IF SOMETHING AWFUL MIGHT HAPPEN: SEVERAL DAYS
3. WORRYING TOO MUCH ABOUT DIFFERENT THINGS: MORE THAN HALF THE DAYS
GAD7 TOTAL SCORE: 12
5. BEING SO RESTLESS THAT IT IS HARD TO SIT STILL: NEARLY EVERY DAY
2. NOT BEING ABLE TO STOP OR CONTROL WORRYING: SEVERAL DAYS
IF YOU CHECKED OFF ANY PROBLEMS ON THIS QUESTIONNAIRE, HOW DIFFICULT HAVE THESE PROBLEMS MADE IT FOR YOU TO DO YOUR WORK, TAKE CARE OF THINGS AT HOME, OR GET ALONG WITH OTHER PEOPLE: SOMEWHAT DIFFICULT
1. FEELING NERVOUS, ANXIOUS, OR ON EDGE: MORE THAN HALF THE DAYS
GAD7 TOTAL SCORE: 12
6. BECOMING EASILY ANNOYED OR IRRITABLE: SEVERAL DAYS

## 2025-07-03 ASSESSMENT — PAIN SCALES - GENERAL
PAINLEVEL_OUTOF10: SEVERE PAIN (8)
PAINLEVEL_OUTOF10: SEVERE PAIN (8)

## 2025-07-03 ASSESSMENT — PATIENT HEALTH QUESTIONNAIRE - PHQ9
SUM OF ALL RESPONSES TO PHQ QUESTIONS 1-9: 13
10. IF YOU CHECKED OFF ANY PROBLEMS, HOW DIFFICULT HAVE THESE PROBLEMS MADE IT FOR YOU TO DO YOUR WORK, TAKE CARE OF THINGS AT HOME, OR GET ALONG WITH OTHER PEOPLE: SOMEWHAT DIFFICULT
SUM OF ALL RESPONSES TO PHQ QUESTIONS 1-9: 13
SUM OF ALL RESPONSES TO PHQ QUESTIONS 1-9: 13
10. IF YOU CHECKED OFF ANY PROBLEMS, HOW DIFFICULT HAVE THESE PROBLEMS MADE IT FOR YOU TO DO YOUR WORK, TAKE CARE OF THINGS AT HOME, OR GET ALONG WITH OTHER PEOPLE: SOMEWHAT DIFFICULT
SUM OF ALL RESPONSES TO PHQ QUESTIONS 1-9: 13

## 2025-07-03 NOTE — NURSING NOTE
"Chief Complaint   Patient presents with    Medication Therapy Management   Patient presents to the Clinic today for medication management.     Initial /75 (BP Location: Right arm, Patient Position: Sitting, Cuff Size: Adult Regular)   Pulse 89   Temp 97.4  F (36.3  C) (Tympanic)   Resp 16   Wt 63 kg (139 lb)   LMP 06/22/2025 (Approximate)   SpO2 100%   BMI 22.95 kg/m   Estimated body mass index is 22.95 kg/m  as calculated from the following:    Height as of 3/26/25: 1.657 m (5' 5.25\").    Weight as of this encounter: 63 kg (139 lb).  Medication Review: complete    The next two questions are to help us understand your food security.  If you are feeling you need any assistance in this area, we have resources available to support you today.          7/3/2025   SDOH- Food Insecurity   Within the past 12 months, did you worry that your food would run out before you got money to buy more? N   Within the past 12 months, did the food you bought just not last and you didn t have money to get more? N             Emi Bautista      "

## 2025-07-03 NOTE — PROGRESS NOTES
Assessment & Plan   Problem List Items Addressed This Visit    None  Visit Diagnoses         Insomnia, unspecified type    -  Primary    Relevant Orders    Adult Sleep Eval & Management  Referral      Snoring        Relevant Orders    Adult Sleep Eval & Management  Referral      Birth control counseling          Constipation, unspecified constipation type               Discussed snoring and insomnia concerns.  Referral for consultation with sleep provider.  Discussed birth control options, she is potentially interested in Nexplanon.  She would like to research further.  Education was provided to her via [employer].  She will schedule an appointment at a future date.  She is aware that she can get pregnant at any time given the fact that she is not using any protection.  Discussed constipation management, MiraLAX, magnesium, Colace and others.      Dipesh Delgado is a 20 year old, presenting for the following health issues:  Consult (Requesting, sleep study, overall health, and birth control )      7/3/2025     2:05 PM   Additional Questions   Roomed by MARIA ISABEL Porter     History of Present Illness       Reason for visit:  Ect    She eats 0-1 servings of fruits and vegetables daily.She consumes 2 sweetened beverage(s) daily.She exercises with enough effort to increase her heart rate 20 to 29 minutes per day.  She exercises with enough effort to increase her heart rate 6 days per week. She is missing 2 dose(s) of medications per week.  She is not taking prescribed medications regularly due to remembering to take.        She presents to clinic today to discuss possible sleep study.  She reports she is able to fall asleep but cannot stay asleep.  She does report taking trazodone and over-the-counter medications in the past.  She is working with mental health provider, taking gabapentin, they have increased the dose to see if this would help with her restlessness.  She reports she has not drank any  alcohol since January.  She does vape and smoke, uses a THC pen and/or marijuana daily.  She typically will feel rested in the morning but by late afternoon.  She then sleeps off and on throughout the night.  She does report she snores.  No witnessed apnea.  She does sleep tach.  She also has not been using birth control for the past 8 months.  She was previously on birth control patch, this was falling off due to her sweating.  She does take daily medications but forgets to take medications 1-2 times a week.  Finally, she has questions about constipation, she reports she goes weekly, hard stools, difficulty the past.  Has tried eating bananas and increasing fiber, MiraLAX in the past.      Objective    BP 98/64 (BP Location: Right arm, Patient Position: Sitting, Cuff Size: Adult Regular)   Pulse 69   Temp 98  F (36.7  C) (Tympanic)   Resp 16   Wt 63 kg (139 lb)   LMP 06/22/2025 (Approximate)   SpO2 98%   Breastfeeding No   BMI 22.95 kg/m    Body mass index is 22.95 kg/m .  Physical Exam   GENERAL: alert and no distress  EYES: Eyes grossly normal to inspection,  NEURO: Normal strength and tone, mentation intact and speech normal  PSYCH: mentation appears normal, affect normal/bright            Signed Electronically by: LUCILA De La Rosa CNP

## 2025-07-03 NOTE — PATIENT INSTRUCTIONS
Medication:   Continue Effexor 187.5mg daily  Increase vyvanse to 40mg daily  Increase Gabapentin to 600mg up to three times daily - if afternoon dose is not needed, may take up to 900mg at bed for restlessness    Psychotherapy: Is seeing a new therapist  Labs: No labs today  F/U: 2 months

## 2025-07-03 NOTE — PROGRESS NOTES
"[facility] PSYCHIATRY   PROGRESS NOTE       ASSESSMENT & PLAN     This is a 20 year old female with a PMH of depression, methamphetamine abuse and anxiety who presents virtually for ongoing psychiatric care and medication management.      Diagnosis Comments   1. Major depressive disorder, recurrent episode, moderate to severe (H)      4/30/2025     7:44 AM 5/29/2025    10:08 AM 7/3/2025    10:24 AM   PHQ   PHQ-9 Total Score 23  14  13    Q9: Thoughts of better off dead/self-harm past 2 weeks Several days Not at all Not at all   F/U: Thoughts of suicide or self-harm Yes     F/U: Self harm-plan No     F/U: Self-harm action No     F/U: Safety concerns No         Patient-reported             2. Methamphetamine use disorder, moderate, in early remission (H)  Prolonged remission - stated she remains sober      3. Anxiety      4/30/2025     7:45 AM 5/29/2025    10:11 AM 7/3/2025    10:25 AM   TEAGAN-7 SCORE   Total Score 21 (severe anxiety) 16 (severe anxiety) 12 (moderate anxiety)   Total Score 21  16  12        Patient-reported                         Depression: Feels like mood has continued to improve, \"like I feel happier,\" but other symptoms impacting overall results. Ongoing issues with sleep - even if she falls asleep, she wakes after a couple hours secondary to restlessness. Tried Doxylamine which seemed to make this worse. 300mg of Gabapentin did not really help. Sometimes will get up and go out onto deck and do jumping jacks to try to resolve restlessness. Ongoing daytime anergia, anhedonia, and low mood at times secondary to this.     Anxiety: Reports improvement in anxiety with use of Gabapentin. Has been using 600mg in the morning and 300mg in the afternoon only if needed. Medication has made social activities easier for her. Still some intermittent symptoms that are milder and less frequent, but no longer feeling high levels of stress and \"wanting to scream.\"     ADHD: Notable improvement " "in motivation, focus, and task initiation. Is now having some wear-off mid-day. Would like to try an increase to see if this helps resolve this issue. No other side effects.     Is meeting with a new primary care provider this afternoon. She does not feel she and the previous provider \"clicked.\" She talked about address constipation and weight with new provider. Feels like she will go days eating less and not losing weight, that her scale at home fluctuates but the scale here is always the same. Wonders if constipation is causing lack of weight loss. She has actually lost 9lbs since April according to our scale, which is most likely secondary to Vyvanse decreasing nighttime binging. Encouraged her to avoid skipping meals, focus more on healthy intake and to make sure her fluid intake is adequate. Her medications can cause fluid depletion and complicate constipation. We also talked about adding a probiotic, or possibly a yogurt with probiotics as she likes yogurt. Increased fiber is also an option, but historically this has caused her bloating and more constipation, so focusing more on fluids may be a better route. Encouraged her to discuss medical concerns with new medical provider given they will have balanced advice.       Medication:   Continue Effexor 187.5mg daily  Increase vyvanse to 40mg daily  Increase Gabapentin to 600mg up to three times daily - if afternoon dose is not needed, may take up to 900mg at bed for restlessness    Psychotherapy: Is seeing a new therapist  Labs: No labs today  F/U: 2 months    The risks, benefits, alternatives and side effects have been discussed and are understood by the patient. The patient understands the risks of using street drugs or alcohol. The patient understands to call 911, 211 ([facility] Crisis Line) or come to the nearest ED if life threatening or urgent symptoms present.    The longitudinal plan of care for the diagnosis(es)/condition(s) as documented were " "addressed during this visit. Due to the added complexity in care, I will continue to support Danny in the subsequent management and with ongoing continuity of care.      HISTORY OF PRESENT ILLNESS     Danny Rosenberg was last seen in clinic on 05/29/2025.  At that time:  Depression: Improvement in symptoms of anhedonia, depressed mood, anergia, feelings of low self-worth, trouble concentrating, restlessness, and resolution of passive suicidal ideation. Struggling with sleep still, noting ability to fall asleep but waking frequently, sometimes every half hour. Unclear what is waking her, reporting sometimes she needs to use the bathroom, other times she wakes up and wants to eat. This has not increased with stimulant use but has not improved. Still overeating, specifically at night. Feels tired during the day secondary to this. Has been using an over the counter sleep aid, z-quil, which helps a little.     Anxiety: Reports ongoing anxious mood, worrying about things and having trouble controlling the worry, trouble relaxing, restlessness, feeling easily irritable, fearing something awful happening. Some mild improvement but nothing significant. Stated her biggest issue is that she will worry about something and have trouble moving on, then become frustrated and \"want to just scream.\" A friend told her this seems to occur during periods of stress and fear.     ADHD: Notable improvement in motivation, focus, and task initiation. Had some headaches the first couple weeks, but increased her fluid intake, which resolved this. No other s/e. No wear off noted and feels dosing is good.     Agreeable to addition of Gabapentin, which can be used three times daily scheduled, or as needed if it makes her tired. May also help with sleep, in addition to anxiety. Also recommended trying Unisom tablets/Doxylamine for sleep instead of z-quil, which is diphenhydramine and more likely to cause hang-over effects in the " morning.     Moving to [place] as of June 1, but would like to continue all her services at [facility].       Initial onset: 7th grade noted anxiety and depression  Duration: ongoing with notable improvement  Modifying factors: medications helpful, has also improved diet, works more, exercises, hangs out with friends and attends Jainism and groups for sobriety  Adherence to treatment: good  Response to treatment: Some improvement  Contributing and complicating factors: methamphetamine abuse with new sobriety, recent pregnancy loss, daily marijuana use;            [facility] Updated   Psychiatric:   Previous trials of sertraline and prozac.  Started therapy at [facility] but can only be seen every month and a half, which is not workign for her. Voluntary 8 day admission to [facility] recently. Childhood trauma history - details not shared. H/O cutting, smashing and punching things, including smashing head repeatedly. Last episode over a month ago.   H/O suicide attempts, at least 8, by overdose and attempting to drive car off of road.     Social:  Now residing in [place] for new job. Single. No children. No HS grad or GED.  No legal issues. No  history. Has been attending J[employer]ism and is hoping to get baptized.     Chemical Use:  Methamphetamine use since January 2023 with two periods of sobriety, both pregnancy related (lost pregnancies). Longest period was 4 months. Now prolonged period of sobriety. Marijuana use daily for anxiety, sleep and stress.     Family:  H/o psychosis, depression, bipolar, and anxiety both sides.          REVIEW OF SYSTEMS   The review of systems is negative other than noted in the HPI.    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      Gastro-esophageal reflux disease without esophagitis     Age 5; had rash with ranitidine; did not try other medication at the time    Glycosuria     Blood test neg for glucose per mother    Major depressive disorder, recurrent episode, moderate (H) 08/26/2024    Methamphetamine use disorder, moderate, in early remission (H) 08/26/2024    Personal history of other diseases of the female genital tract     term, no complications    Simple febrile convulsions (H)     4/13/2017      Current Outpatient Medications   Medication Sig Dispense Refill    gabapentin (NEURONTIN) 300 MG capsule Take 2 capsules (600 mg) by mouth 3 times daily. 180 capsule 1    lisdexamfetamine (VYVANSE) 40 MG capsule Take 1 capsule (40 mg) by mouth daily. 30 capsule 0    [START ON 8/2/2025] lisdexamfetamine (VYVANSE) 40 MG capsule Take 1 capsule (40 mg) by mouth daily. 30 capsule 0    [START ON 9/1/2025] lisdexamfetamine (VYVANSE) 40 MG capsule Take 1 capsule (40 mg) by mouth daily. 30 capsule 0    venlafaxine (EFFEXOR XR) 150 MG 24 hr capsule Take 1 capsule (150 mg) by mouth daily. 30 capsule 3    venlafaxine (EFFEXOR XR) 37.5 MG 24 hr capsule Take 1 capsule (37.5 mg) by mouth daily. 30 capsule 3    XULANE 150-35 MCG/24HR patch  (Patient not taking: Reported on 7/3/2025)       No current facility-administered medications for this visit.               MENTAL STATUS EXAM   Vitals: /75 (BP Location: Right arm, Patient Position: Sitting, Cuff Size: Adult Regular)   Pulse 89   Temp 97.4  F (36.3  C) (Tympanic)   Resp 16   Wt 63 kg (139 lb)   LMP 06/22/2025 (Approximate)   SpO2 100%   BMI 22.95 kg/m      Appearance:  awake, alert and adequately groomed  Attitude:  cooperative  Eye Contact:  good  Mood: improved  Affect:  appropriate, congruent  Speech:  clear, coherent  Psychomotor Behavior:  no evidence of tardive dyskinesia, dystonia, or tics  Thought Process:  logical, linear, and goal  oriented  Associations:  no loose associations  Thought Content:  No SI present, No HI, no evidence of psychosis or perceptual disturbances  Insight:  good  Judgment:  intact  Oriented to:  time, person, and place  Attention Span and Concentration:  intact  Recent and Remote Memory:  intact  Language: Able to name objects and Able to repeat phrases  Fund of Knowledge: appropriate    Suicide Risk Assessment:  Today Danny Rosenberg denied SI. In addition, she has notable risk factors for self-harm, including previous attempts, single status, anxiety, and substance abuse. However, risk is mitigated by commitment to family, [employer] beliefs, and sobriety. Therefore, based on all available evidence including the factors cited above, she does not appear to be at imminent risk for self-harm, does not meet criteria for a 72-hr hold, and therefore remains appropriate for ongoing outpatient level of care. Voluntary referral for individualized therapy was offered, she accepted this offer.        No recent labs to review    Allergies   Allergen Reactions    Ranitidine Hives       ATTESTATION    Areas addressed: Depression, moderate, unstable but improving; methamphetamine abuse, chronic and in remission; anxiety, chronic, improving; ADHD, chronic, unstable - medication wear-off  Medication management  No independent Historian  No outside data ordered or reviewed on this day  No social determinates of health impacting provider's ability to diagnose or treat.  Moderate risk of complications, morbidity, and/or mortality of patient management decision made at visit, associated with patient's problem, diagnoses, procedures and treatments.    LUCILA Harrison,CNP, PFNP    As the provider I attest to compliance with applicable laws and regulations related to telemedicine.   48 minutes spent on the date of the encounter doing chart review, history and exam, documentation and further activities per the note.

## 2025-07-03 NOTE — NURSING NOTE
"Chief Complaint   Patient presents with    Consult     Requesting, sleep study, overall health, and birth control        Initial BP 98/64 (BP Location: Right arm, Patient Position: Sitting, Cuff Size: Adult Regular)   Pulse 69   Temp 98  F (36.7  C) (Tympanic)   Resp 16   Wt 63 kg (139 lb)   LMP 06/22/2025 (Approximate)   SpO2 98%   Breastfeeding No   BMI 22.95 kg/m   Estimated body mass index is 22.95 kg/m  as calculated from the following:    Height as of 3/26/25: 1.657 m (5' 5.25\").    Weight as of this encounter: 63 kg (139 lb).  Medication Reconciliation: complete    Charlotte Garza LPN    Advance Care Directive reviewed    "

## 2025-07-05 NOTE — NURSING NOTE
"Pt presents to  for derm problem on her chest. Pt thought it might be from her swimsuit yesterday, but has thoughts about ringworm.    Chief Complaint   Patient presents with     Derm Problem     Chest       FOOD SECURITY SCREENING QUESTIONS  Hunger Vital Signs:  Within the past 12 months we worried whether our food would run out before we got money to buy more. Never  Within the past 12 months the food we bought just didn't last and we didn't have money to get more. Never  Jessica Dearmon 7/4/2023 3:51 PM      Initial /72 (BP Location: Left arm, Patient Position: Sitting, Cuff Size: Adult Regular)   Pulse 89   Temp 97.6  F (36.4  C) (Tympanic)   Resp 12   Ht 1.702 m (5' 7\")   Wt 58.5 kg (128 lb 14.4 oz)   LMP 06/13/2023 (Exact Date)   SpO2 100%   BMI 20.19 kg/m   Estimated body mass index is 20.19 kg/m  as calculated from the following:    Height as of this encounter: 1.702 m (5' 7\").    Weight as of this encounter: 58.5 kg (128 lb 14.4 oz).  Medication Reconciliation: complete    Jessica Dearmon  " Anemia is likely due to chronic disease. Most recent hemoglobin and hematocrit are listed below.  Recent Labs     07/04/25  1242 07/04/25  2146   HGB 9.7* 9.8*   HCT 32.1* 32.3*     Plan  - Monitor serial CBC: Daily  - Transfuse PRBC if patient becomes hemodynamically unstable, symptomatic or H/H drops below 7/21.  - Patient has not received any PRBC transfusions to date  - Patient's anemia is currently stable

## 2025-08-18 ENCOUNTER — MYC MEDICAL ADVICE (OUTPATIENT)
Dept: PULMONOLOGY | Facility: OTHER | Age: 20
End: 2025-08-18

## 2025-08-18 ASSESSMENT — SLEEP AND FATIGUE QUESTIONNAIRES
HOW LIKELY ARE YOU TO NOD OFF OR FALL ASLEEP WHILE LYING DOWN TO REST IN THE AFTERNOON WHEN CIRCUMSTANCES PERMIT: HIGH CHANCE OF DOZING
HOW LIKELY ARE YOU TO NOD OFF OR FALL ASLEEP WHILE WATCHING TV: HIGH CHANCE OF DOZING
HOW LIKELY ARE YOU TO NOD OFF OR FALL ASLEEP WHILE SITTING QUIETLY AFTER LUNCH WITHOUT ALCOHOL: HIGH CHANCE OF DOZING
HOW LIKELY ARE YOU TO NOD OFF OR FALL ASLEEP WHILE SITTING AND TALKING TO SOMEONE: SLIGHT CHANCE OF DOZING
HOW LIKELY ARE YOU TO NOD OFF OR FALL ASLEEP WHILE SITTING AND READING: HIGH CHANCE OF DOZING
HOW LIKELY ARE YOU TO NOD OFF OR FALL ASLEEP WHILE SITTING INACTIVE IN A PUBLIC PLACE: HIGH CHANCE OF DOZING
HOW LIKELY ARE YOU TO NOD OFF OR FALL ASLEEP IN A CAR, WHILE STOPPED FOR A FEW MINUTES IN TRAFFIC: SLIGHT CHANCE OF DOZING
HOW LIKELY ARE YOU TO NOD OFF OR FALL ASLEEP WHEN YOU ARE A PASSENGER IN A CAR FOR AN HOUR WITHOUT A BREAK: HIGH CHANCE OF DOZING

## 2025-08-26 DIAGNOSIS — F41.9 ANXIETY: ICD-10-CM

## 2025-08-28 DIAGNOSIS — R06.83 SNORING: Primary | ICD-10-CM

## 2025-09-02 ENCOUNTER — TELEPHONE (OUTPATIENT)
Dept: PSYCHIATRY | Facility: OTHER | Age: 20
End: 2025-09-02
Payer: COMMERCIAL

## 2025-09-04 RX ORDER — GABAPENTIN 300 MG/1
600 CAPSULE ORAL 3 TIMES DAILY
Qty: 180 CAPSULE | Refills: 0 | Status: SHIPPED | OUTPATIENT
Start: 2025-09-04

## (undated) RX ORDER — ONDANSETRON 2 MG/ML
INJECTION INTRAMUSCULAR; INTRAVENOUS
Status: DISPENSED
Start: 2021-09-12

## (undated) RX ORDER — ACETAMINOPHEN 500 MG
TABLET ORAL
Status: DISPENSED
Start: 2021-04-21

## (undated) RX ORDER — CEFTRIAXONE SODIUM 1 G/50ML
INJECTION, SOLUTION INTRAVENOUS
Status: DISPENSED
Start: 2021-09-12

## (undated) RX ORDER — CEFTRIAXONE SODIUM 1 G
VIAL (EA) INJECTION
Status: DISPENSED
Start: 2018-08-11

## (undated) RX ORDER — ACETAMINOPHEN 325 MG/1
TABLET ORAL
Status: DISPENSED
Start: 2024-09-08

## (undated) RX ORDER — CIPROFLOXACIN 500 MG/1
TABLET, FILM COATED ORAL
Status: DISPENSED
Start: 2022-08-18

## (undated) RX ORDER — ACETAMINOPHEN 500 MG
TABLET ORAL
Status: DISPENSED
Start: 2022-10-06

## (undated) RX ORDER — SODIUM CHLORIDE 9 MG/ML
INJECTION, SOLUTION INTRAVENOUS
Status: DISPENSED
Start: 2021-04-21

## (undated) RX ORDER — IBUPROFEN 200 MG
TABLET ORAL
Status: DISPENSED
Start: 2018-08-11

## (undated) RX ORDER — LIDOCAINE HYDROCHLORIDE 10 MG/ML
INJECTION, SOLUTION INFILTRATION; PERINEURAL
Status: DISPENSED
Start: 2018-08-11

## (undated) RX ORDER — KETOROLAC TROMETHAMINE 30 MG/ML
INJECTION, SOLUTION INTRAMUSCULAR; INTRAVENOUS
Status: DISPENSED
Start: 2022-08-18

## (undated) RX ORDER — LEVONORGESTREL 1.5 MG/1
TABLET ORAL
Status: DISPENSED
Start: 2024-07-08

## (undated) RX ORDER — SODIUM CHLORIDE 9 MG/ML
INJECTION, SOLUTION INTRAVENOUS
Status: DISPENSED
Start: 2021-09-12